# Patient Record
Sex: MALE | Race: WHITE | Employment: OTHER | URBAN - METROPOLITAN AREA
[De-identification: names, ages, dates, MRNs, and addresses within clinical notes are randomized per-mention and may not be internally consistent; named-entity substitution may affect disease eponyms.]

---

## 2020-09-16 ENCOUNTER — TELEPHONE (OUTPATIENT)
Dept: FAMILY MEDICINE CLINIC | Age: 65
End: 2020-09-16

## 2020-09-16 NOTE — TELEPHONE ENCOUNTER
Pt tried to refill his lisinopril and it was rejected.  He said the pharmacy told him the Dr. Nellie Monaco denied the script and the pt wants to know why

## 2021-02-02 ENCOUNTER — OFFICE VISIT (OUTPATIENT)
Dept: PRIMARY CARE CLINIC | Age: 66
End: 2021-02-02
Payer: COMMERCIAL

## 2021-02-02 VITALS
SYSTOLIC BLOOD PRESSURE: 138 MMHG | DIASTOLIC BLOOD PRESSURE: 78 MMHG | HEIGHT: 68 IN | HEART RATE: 76 BPM | OXYGEN SATURATION: 96 % | TEMPERATURE: 97 F | BODY MASS INDEX: 33.56 KG/M2 | WEIGHT: 221.4 LBS

## 2021-02-02 DIAGNOSIS — G89.29 CHRONIC RIGHT SHOULDER PAIN: ICD-10-CM

## 2021-02-02 DIAGNOSIS — E11.9 TYPE 2 DIABETES MELLITUS WITHOUT COMPLICATION, WITHOUT LONG-TERM CURRENT USE OF INSULIN (HCC): Primary | ICD-10-CM

## 2021-02-02 DIAGNOSIS — M10.9 ACUTE GOUT OF RIGHT FOOT, UNSPECIFIED CAUSE: ICD-10-CM

## 2021-02-02 DIAGNOSIS — M25.511 CHRONIC RIGHT SHOULDER PAIN: ICD-10-CM

## 2021-02-02 PROBLEM — I25.10 CORONARY ATHEROSCLEROSIS: Status: ACTIVE | Noted: 2021-02-02

## 2021-02-02 PROBLEM — I10 HYPERTENSION: Status: ACTIVE | Noted: 2021-02-02

## 2021-02-02 PROBLEM — E78.5 DYSLIPIDEMIA: Status: ACTIVE | Noted: 2021-02-02

## 2021-02-02 LAB — HBA1C MFR BLD: 8.5 %

## 2021-02-02 PROCEDURE — 3052F HG A1C>EQUAL 8.0%<EQUAL 9.0%: CPT | Performed by: FAMILY MEDICINE

## 2021-02-02 PROCEDURE — 99214 OFFICE O/P EST MOD 30 MIN: CPT | Performed by: FAMILY MEDICINE

## 2021-02-02 PROCEDURE — 83036 HEMOGLOBIN GLYCOSYLATED A1C: CPT | Performed by: FAMILY MEDICINE

## 2021-02-02 RX ORDER — AMLODIPINE BESYLATE 10 MG/1
10 TABLET ORAL DAILY
COMMUNITY
Start: 2021-01-04

## 2021-02-02 RX ORDER — GLIMEPIRIDE 2 MG/1
TABLET ORAL
Qty: 540 TABLET | Refills: 1 | Status: SHIPPED | OUTPATIENT
Start: 2021-02-02 | End: 2021-08-24

## 2021-02-02 RX ORDER — CARVEDILOL 6.25 MG/1
6.25 TABLET ORAL 2 TIMES DAILY
COMMUNITY
Start: 2021-01-25

## 2021-02-02 RX ORDER — LISINOPRIL AND HYDROCHLOROTHIAZIDE 25; 20 MG/1; MG/1
1 TABLET ORAL DAILY
COMMUNITY
End: 2021-02-02 | Stop reason: SDUPTHER

## 2021-02-02 RX ORDER — METFORMIN HYDROCHLORIDE 500 MG/1
TABLET, EXTENDED RELEASE ORAL
COMMUNITY
Start: 2020-12-10 | End: 2021-02-02 | Stop reason: SDUPTHER

## 2021-02-02 RX ORDER — GLIMEPIRIDE 2 MG/1
2 TABLET ORAL
COMMUNITY
End: 2021-02-02 | Stop reason: SDUPTHER

## 2021-02-02 RX ORDER — LISINOPRIL AND HYDROCHLOROTHIAZIDE 25; 20 MG/1; MG/1
1 TABLET ORAL DAILY
Qty: 90 TABLET | Refills: 1 | Status: SHIPPED | OUTPATIENT
Start: 2021-02-02 | End: 2021-07-26

## 2021-02-02 RX ORDER — METFORMIN HYDROCHLORIDE 500 MG/1
TABLET, EXTENDED RELEASE ORAL
Qty: 360 TABLET | Refills: 1 | Status: SHIPPED | OUTPATIENT
Start: 2021-02-02 | End: 2021-12-15 | Stop reason: SDUPTHER

## 2021-02-02 RX ORDER — CLOPIDOGREL BISULFATE 75 MG/1
75 TABLET ORAL DAILY
COMMUNITY
Start: 2020-11-16

## 2021-02-02 RX ORDER — LISINOPRIL 10 MG/1
10 TABLET ORAL DAILY
Qty: 90 TABLET | Refills: 1 | Status: SHIPPED | OUTPATIENT
Start: 2021-02-02 | End: 2021-07-26

## 2021-02-02 RX ORDER — LISINOPRIL 10 MG/1
10 TABLET ORAL DAILY
COMMUNITY
Start: 2020-12-08 | End: 2021-02-02 | Stop reason: SDUPTHER

## 2021-02-02 RX ORDER — INDOMETHACIN 50 MG/1
50 CAPSULE ORAL 3 TIMES DAILY
Qty: 60 CAPSULE | Refills: 0 | Status: SHIPPED | OUTPATIENT
Start: 2021-02-02 | End: 2021-11-01

## 2021-02-02 SDOH — ECONOMIC STABILITY: FOOD INSECURITY: WITHIN THE PAST 12 MONTHS, THE FOOD YOU BOUGHT JUST DIDN'T LAST AND YOU DIDN'T HAVE MONEY TO GET MORE.: NEVER TRUE

## 2021-02-02 SDOH — ECONOMIC STABILITY: FOOD INSECURITY: WITHIN THE PAST 12 MONTHS, YOU WORRIED THAT YOUR FOOD WOULD RUN OUT BEFORE YOU GOT MONEY TO BUY MORE.: NEVER TRUE

## 2021-02-02 SDOH — ECONOMIC STABILITY: TRANSPORTATION INSECURITY
IN THE PAST 12 MONTHS, HAS THE LACK OF TRANSPORTATION KEPT YOU FROM MEDICAL APPOINTMENTS OR FROM GETTING MEDICATIONS?: NOT ASKED

## 2021-02-02 ASSESSMENT — PATIENT HEALTH QUESTIONNAIRE - PHQ9
SUM OF ALL RESPONSES TO PHQ9 QUESTIONS 1 & 2: 0
1. LITTLE INTEREST OR PLEASURE IN DOING THINGS: 0
SUM OF ALL RESPONSES TO PHQ QUESTIONS 1-9: 0

## 2021-02-02 ASSESSMENT — ENCOUNTER SYMPTOMS
ALLERGIC/IMMUNOLOGIC NEGATIVE: 1
GASTROINTESTINAL NEGATIVE: 1
EYES NEGATIVE: 1
RESPIRATORY NEGATIVE: 1

## 2021-02-02 NOTE — PROGRESS NOTES
-     glimepiride (AMARYL) 2 MG tablet; 2 TABS IN THE MORNING AND 3 TABS AT NIGHT  -     indomethacin (INDOCIN) 50 MG capsule;  Take 1 capsule by mouth 3 times daily    try indocin for foot and shoukder  Discussed cardiac warnings            Electronically signed by Gus Rosado MD on 2/2/2021 at 9:58 AM

## 2021-02-23 ENCOUNTER — PATIENT MESSAGE (OUTPATIENT)
Dept: PRIMARY CARE CLINIC | Age: 66
End: 2021-02-23

## 2021-02-24 NOTE — TELEPHONE ENCOUNTER
From: Durene Gilford  To: Luli Leach MD  Sent: 2/23/2021 6:18 PM EST  Subject: Non-Urgent Medical Question    i had a office visit with doctor [facility] Samm Craig and received a bill for $191.00 . Is this what is being charged for an office visit. ? the doctor spent 10 minutes with me.

## 2021-06-08 ENCOUNTER — TELEPHONE (OUTPATIENT)
Dept: PRIMARY CARE CLINIC | Age: 66
End: 2021-06-08

## 2021-06-08 NOTE — TELEPHONE ENCOUNTER
Called and left voicemail for patient to call back- need to know if he will have a colonoscopy or cologuard done

## 2021-06-14 ENCOUNTER — OFFICE VISIT (OUTPATIENT)
Dept: PRIMARY CARE CLINIC | Age: 66
End: 2021-06-14
Payer: MEDICARE

## 2021-06-14 VITALS
HEART RATE: 73 BPM | DIASTOLIC BLOOD PRESSURE: 89 MMHG | OXYGEN SATURATION: 97 % | HEIGHT: 68 IN | WEIGHT: 200.4 LBS | BODY MASS INDEX: 30.37 KG/M2 | SYSTOLIC BLOOD PRESSURE: 135 MMHG

## 2021-06-14 DIAGNOSIS — E11.9 TYPE 2 DIABETES MELLITUS WITHOUT COMPLICATION, WITHOUT LONG-TERM CURRENT USE OF INSULIN (HCC): Primary | ICD-10-CM

## 2021-06-14 PROCEDURE — 1123F ACP DISCUSS/DSCN MKR DOCD: CPT | Performed by: FAMILY MEDICINE

## 2021-06-14 PROCEDURE — 3017F COLORECTAL CA SCREEN DOC REV: CPT | Performed by: FAMILY MEDICINE

## 2021-06-14 PROCEDURE — 4040F PNEUMOC VAC/ADMIN/RCVD: CPT | Performed by: FAMILY MEDICINE

## 2021-06-14 PROCEDURE — 1036F TOBACCO NON-USER: CPT | Performed by: FAMILY MEDICINE

## 2021-06-14 PROCEDURE — 83036 HEMOGLOBIN GLYCOSYLATED A1C: CPT | Performed by: FAMILY MEDICINE

## 2021-06-14 PROCEDURE — G8417 CALC BMI ABV UP PARAM F/U: HCPCS | Performed by: FAMILY MEDICINE

## 2021-06-14 PROCEDURE — G8427 DOCREV CUR MEDS BY ELIG CLIN: HCPCS | Performed by: FAMILY MEDICINE

## 2021-06-14 PROCEDURE — 2022F DILAT RTA XM EVC RTNOPTHY: CPT | Performed by: FAMILY MEDICINE

## 2021-06-14 PROCEDURE — 99213 OFFICE O/P EST LOW 20 MIN: CPT | Performed by: FAMILY MEDICINE

## 2021-06-14 PROCEDURE — 3052F HG A1C>EQUAL 8.0%<EQUAL 9.0%: CPT | Performed by: FAMILY MEDICINE

## 2021-06-14 RX ORDER — AMLODIPINE BESYLATE 10 MG/1
10 TABLET ORAL DAILY
COMMUNITY
Start: 2021-04-08 | End: 2021-11-01

## 2021-06-15 NOTE — PROGRESS NOTES
Subjective:      Patient ID: Judy Catherine is a 78 y.o. male. Sugars not controlled A1C cant even be measured  Has financial constraints  Doesn't want insulin but we cant find another oral to prescribe      Review of Systems   Constitutional: Negative. All other systems reviewed and are negative. Objective:   Physical Exam  Vitals reviewed. HENT:      Head: Normocephalic. Nose: Nose normal.   Eyes:      Pupils: Pupils are equal, round, and reactive to light. Cardiovascular:      Rate and Rhythm: Normal rate and regular rhythm. Pulmonary:      Effort: Pulmonary effort is normal.   Abdominal:      General: Abdomen is flat. Musculoskeletal:         General: Normal range of motion. Cervical back: Normal range of motion. Skin:     General: Skin is warm. Neurological:      Mental Status: He is alert. Psychiatric:         Mood and Affect: Mood normal.         Thought Content: Thought content normal.         Assessment:      1. Type 2 diabetes mellitus without complication, without long-term current use of insulin ([facility] [address]            Plan:      Margareth Alcantara was seen today for hypertension and diabetes.     Diagnoses and all orders for this visit:    Type 2 diabetes mellitus without complication, without long-term current use of insulin ([facility])  -     POCT glycosylated hemoglobin (Hb A1C)      Try to find an oral he can afford          Electronically signed by Drake Reese MD on 6/14/2021 at 1:03 PM

## 2021-06-24 ENCOUNTER — TELEPHONE (OUTPATIENT)
Dept: PRIMARY CARE CLINIC | Age: 66
End: 2021-06-24

## 2021-06-24 NOTE — TELEPHONE ENCOUNTER
Was in last week for diabetes check. A1C was not recorded. States Dood was going to change or add a new med to help with control. Pt says his blood sugar is at 200. He would rather not take insulin, he wants an oral medication instead.

## 2021-06-25 NOTE — TELEPHONE ENCOUNTER
Patient called and stated the med is 400 if you have no other options for him he states he can get it cheap in [facility] with a hand written script.

## 2021-06-29 ENCOUNTER — TELEPHONE (OUTPATIENT)
Dept: PRIMARY CARE CLINIC | Age: 66
End: 2021-06-29

## 2021-07-13 ENCOUNTER — NURSE TRIAGE (OUTPATIENT)
Dept: OTHER | Facility: CLINIC | Age: 66
End: 2021-07-13

## 2021-07-13 NOTE — TELEPHONE ENCOUNTER
Reason for Disposition   Numbness (i.e., loss of sensation) in hand or fingers    Answer Assessment - Initial Assessment Questions  1. ONSET: \"When did the pain start? \"      Started a year ago     2. LOCATION: \"Where is the pain located? \"      Left arm/shoulder - \"ulnar nerve done 25 years ago\"    3. PAIN: \"How bad is the pain? \" (Scale 1-10; or mild, moderate, severe)    - MILD (1-3): doesn't interfere with normal activities    - MODERATE (4-7): interferes with normal activities (e.g., work or school) or awakens from sleep    - SEVERE (8-10): excruciating pain, unable to do any normal activities, unable to hold a cup of water      Annoying per pt - \"I seem to be loosing power and strength in that arm\"       4. WORK OR EXERCISE: \"Has there been any recent work or exercise that involved this part of the body? \"      Denies     5. CAUSE: \"What do you think is causing the arm pain? \"      \"nerve in the elbow or something in the shoulder \"     6. OTHER SYMPTOMS: \"Do you have any other symptoms? \" (e.g., neck pain, swelling, rash, fever, numbness, weakness)     Hands go numb every once in a while- last 6-8 months - intermittent     7. PREGNANCY: \"Is there any chance you are pregnant? \" \"When was your last menstrual period? \"      N/A    Protocols used: ARM PAIN-ADULT-    Received call from Mina  at [facility] with [employer]. Brief description of triage: chronic pain in the left arm/shoulder. See assessment above. Triage indicates for patient to See PCP in 24 hours. Care advice provided, patient verbalizes understanding; denies any other questions or concerns; instructed to call back for any new or worsening symptoms. Writer provided warm transfer to [facility]  at [facility] for appointment scheduling. Attention Provider: Thank you for allowing me to participate in the care of your patient. The patient was connected to triage in response to information provided to the [facility].   Please do not respond through this encounter as the response is not directed to a shared pool.

## 2021-07-14 ENCOUNTER — OFFICE VISIT (OUTPATIENT)
Dept: PRIMARY CARE CLINIC | Age: 66
End: 2021-07-14
Payer: MEDICARE

## 2021-07-14 VITALS
HEIGHT: 68 IN | BODY MASS INDEX: 33.95 KG/M2 | SYSTOLIC BLOOD PRESSURE: 125 MMHG | DIASTOLIC BLOOD PRESSURE: 82 MMHG | WEIGHT: 224 LBS | HEART RATE: 76 BPM | OXYGEN SATURATION: 93 %

## 2021-07-14 DIAGNOSIS — G89.29 CHRONIC LEFT SHOULDER PAIN: Primary | ICD-10-CM

## 2021-07-14 DIAGNOSIS — M25.512 CHRONIC LEFT SHOULDER PAIN: Primary | ICD-10-CM

## 2021-07-14 PROCEDURE — 99213 OFFICE O/P EST LOW 20 MIN: CPT | Performed by: FAMILY MEDICINE

## 2021-07-14 PROCEDURE — G8427 DOCREV CUR MEDS BY ELIG CLIN: HCPCS | Performed by: FAMILY MEDICINE

## 2021-07-14 PROCEDURE — 3017F COLORECTAL CA SCREEN DOC REV: CPT | Performed by: FAMILY MEDICINE

## 2021-07-14 PROCEDURE — G8417 CALC BMI ABV UP PARAM F/U: HCPCS | Performed by: FAMILY MEDICINE

## 2021-07-14 PROCEDURE — 1036F TOBACCO NON-USER: CPT | Performed by: FAMILY MEDICINE

## 2021-07-14 PROCEDURE — 4040F PNEUMOC VAC/ADMIN/RCVD: CPT | Performed by: FAMILY MEDICINE

## 2021-07-14 PROCEDURE — 1123F ACP DISCUSS/DSCN MKR DOCD: CPT | Performed by: FAMILY MEDICINE

## 2021-07-16 NOTE — PROGRESS NOTES
Subjective:      Patient ID: Javon Reyes is a 78 y.o. male. Now that he is retired he is ready to work on shoulder    Other        Review of Systems   Constitutional: Negative. Musculoskeletal:        Pain and restricted motion of left shoulder   All other systems reviewed and are negative. Objective:   Physical Exam  Vitals reviewed. Constitutional:       Appearance: Normal appearance. Cardiovascular:      Pulses: Normal pulses. Pulmonary:      Effort: Pulmonary effort is normal.   Musculoskeletal:      Comments: Painful limited ROM left shoulder         Assessment:      1. Chronic left shoulder pain            Plan:      Maggie Coffey was seen today for other.     Diagnoses and all orders for this visit:    Chronic left shoulder pain  -     Michelle Ordaz DO, Orthopedic Surgery, [place]                Electronically signed by Cezar Medeiros MD on 7/14/2021 at 1:09 PM

## 2021-07-23 ENCOUNTER — PATIENT MESSAGE (OUTPATIENT)
Dept: PRIMARY CARE CLINIC | Age: 66
End: 2021-07-23

## 2021-07-23 NOTE — TELEPHONE ENCOUNTER
From: Carol Munoz  To: Glenys Mccarthy MD  Sent: 7/23/2021 1:05 PM EDT  Subject: Prescription Question    is it possible for doctor to renew my lisinopril/HCTZ 20-25 tab and my Lisinopril 10mg today ?

## 2021-07-26 RX ORDER — LISINOPRIL AND HYDROCHLOROTHIAZIDE 25; 20 MG/1; MG/1
TABLET ORAL
Qty: 90 TABLET | Refills: 0 | Status: SHIPPED | OUTPATIENT
Start: 2021-07-26 | End: 2021-07-27 | Stop reason: SDUPTHER

## 2021-07-26 RX ORDER — LISINOPRIL 10 MG/1
TABLET ORAL
Qty: 90 TABLET | Refills: 0 | Status: SHIPPED | OUTPATIENT
Start: 2021-07-26 | End: 2021-11-01

## 2021-07-28 RX ORDER — LISINOPRIL AND HYDROCHLOROTHIAZIDE 25; 20 MG/1; MG/1
TABLET ORAL
Qty: 90 TABLET | Refills: 0 | Status: SHIPPED | OUTPATIENT
Start: 2021-07-28 | End: 2022-01-12 | Stop reason: SDUPTHER

## 2021-08-24 RX ORDER — GLIMEPIRIDE 2 MG/1
TABLET ORAL
Qty: 540 TABLET | Refills: 0 | Status: SHIPPED | OUTPATIENT
Start: 2021-08-24 | End: 2022-01-07 | Stop reason: SDUPTHER

## 2021-09-22 DIAGNOSIS — R19.7 DIARRHEA, UNSPECIFIED TYPE: Primary | ICD-10-CM

## 2021-10-02 ENCOUNTER — PATIENT MESSAGE (OUTPATIENT)
Dept: PRIMARY CARE CLINIC | Age: 66
End: 2021-10-02

## 2021-10-04 NOTE — TELEPHONE ENCOUNTER
From: Ishmael Murders  To: Dennis Nino MD  Sent: 10/2/2021 10:03 AM EDT  Subject: Non-Urgent Medical Question    did you get me a GI for my diarrhea problem ?

## 2021-10-25 ENCOUNTER — TELEPHONE (OUTPATIENT)
Dept: GASTROENTEROLOGY | Age: 66
End: 2021-10-25

## 2021-11-01 ENCOUNTER — HOSPITAL ENCOUNTER (OUTPATIENT)
Age: 66
Setting detail: SPECIMEN
Discharge: HOME | End: 2021-11-01
Payer: MEDICARE

## 2021-11-01 ENCOUNTER — OFFICE VISIT (OUTPATIENT)
Dept: GASTROENTEROLOGY | Age: 66
End: 2021-11-01
Payer: MEDICARE

## 2021-11-01 VITALS
WEIGHT: 217 LBS | TEMPERATURE: 98.2 F | HEIGHT: 68 IN | SYSTOLIC BLOOD PRESSURE: 139 MMHG | HEART RATE: 76 BPM | OXYGEN SATURATION: 93 % | DIASTOLIC BLOOD PRESSURE: 72 MMHG | BODY MASS INDEX: 32.89 KG/M2

## 2021-11-01 DIAGNOSIS — R19.7 DIARRHEA, UNSPECIFIED TYPE: ICD-10-CM

## 2021-11-01 DIAGNOSIS — R19.7 DIARRHEA, UNSPECIFIED TYPE: Primary | ICD-10-CM

## 2021-11-01 LAB
ABSOLUTE EOS #: 0.09 K/UL (ref 0–0.44)
ABSOLUTE IMMATURE GRANULOCYTE: <0.03 K/UL (ref 0–0.3)
ABSOLUTE LYMPH #: 2.49 K/UL (ref 1.1–3.7)
ABSOLUTE MONO #: 0.63 K/UL (ref 0.1–1.2)
ALBUMIN SERPL-MCNC: 4.6 G/DL (ref 3.5–5.2)
ALBUMIN/GLOBULIN RATIO: 1.4 (ref 1–2.5)
ALP BLD-CCNC: 124 U/L (ref 40–129)
ALT SERPL-CCNC: 31 U/L (ref 5–41)
AST SERPL-CCNC: 20 U/L
BASOPHILS # BLD: 0 % (ref 0–2)
BASOPHILS ABSOLUTE: 0.04 K/UL (ref 0–0.2)
BILIRUB SERPL-MCNC: 0.48 MG/DL (ref 0.3–1.2)
BILIRUBIN DIRECT: 0.08 MG/DL
BILIRUBIN, INDIRECT: 0.4 MG/DL (ref 0–1)
DIFFERENTIAL TYPE: ABNORMAL
EOSINOPHILS RELATIVE PERCENT: 1 % (ref 1–4)
GLOBULIN: NORMAL G/DL (ref 1.5–3.8)
HCT VFR BLD CALC: 55.7 % (ref 40.7–50.3)
HEMOGLOBIN: 18.4 G/DL (ref 13–17)
IMMATURE GRANULOCYTES: 0 %
LIPASE: 137 U/L (ref 13–60)
LYMPHOCYTES # BLD: 28 % (ref 24–43)
MCH RBC QN AUTO: 29.8 PG (ref 25.2–33.5)
MCHC RBC AUTO-ENTMCNC: 33 G/DL (ref 28.4–34.8)
MCV RBC AUTO: 90.3 FL (ref 82.6–102.9)
MONOCYTES # BLD: 7 % (ref 3–12)
NRBC AUTOMATED: 0 PER 100 WBC
PDW BLD-RTO: 13.2 % (ref 11.8–14.4)
PLATELET # BLD: 245 K/UL (ref 138–453)
PLATELET ESTIMATE: ABNORMAL
PMV BLD AUTO: 9.8 FL (ref 8.1–13.5)
RBC # BLD: 6.17 M/UL (ref 4.21–5.77)
RBC # BLD: ABNORMAL 10*6/UL
SEG NEUTROPHILS: 64 % (ref 36–65)
SEGMENTED NEUTROPHILS ABSOLUTE COUNT: 5.75 K/UL (ref 1.5–8.1)
TOTAL PROTEIN: 8 G/DL (ref 6.4–8.3)
WBC # BLD: 9 K/UL (ref 3.5–11.3)
WBC # BLD: ABNORMAL 10*3/UL

## 2021-11-01 PROCEDURE — G8484 FLU IMMUNIZE NO ADMIN: HCPCS | Performed by: INTERNAL MEDICINE

## 2021-11-01 PROCEDURE — G8427 DOCREV CUR MEDS BY ELIG CLIN: HCPCS | Performed by: INTERNAL MEDICINE

## 2021-11-01 PROCEDURE — 4040F PNEUMOC VAC/ADMIN/RCVD: CPT | Performed by: INTERNAL MEDICINE

## 2021-11-01 PROCEDURE — G8417 CALC BMI ABV UP PARAM F/U: HCPCS | Performed by: INTERNAL MEDICINE

## 2021-11-01 PROCEDURE — 1036F TOBACCO NON-USER: CPT | Performed by: INTERNAL MEDICINE

## 2021-11-01 PROCEDURE — 1123F ACP DISCUSS/DSCN MKR DOCD: CPT | Performed by: INTERNAL MEDICINE

## 2021-11-01 PROCEDURE — 99204 OFFICE O/P NEW MOD 45 MIN: CPT | Performed by: INTERNAL MEDICINE

## 2021-11-01 PROCEDURE — 3017F COLORECTAL CA SCREEN DOC REV: CPT | Performed by: INTERNAL MEDICINE

## 2021-11-01 RX ORDER — POLYETHYLENE GLYCOL 3350 17 G/17G
POWDER, FOR SOLUTION ORAL
Qty: 238 G | Refills: 0 | Status: SHIPPED | OUTPATIENT
Start: 2021-11-01 | End: 2021-12-14

## 2021-11-01 ASSESSMENT — ENCOUNTER SYMPTOMS
BLOOD IN STOOL: 0
SORE THROAT: 0
ABDOMINAL DISTENTION: 0
DIARRHEA: 1
ANAL BLEEDING: 0
COLOR CHANGE: 0
RECTAL PAIN: 1
COUGH: 0
CONSTIPATION: 0
BACK PAIN: 0
WHEEZING: 0
SHORTNESS OF BREATH: 0
NAUSEA: 0
ABDOMINAL PAIN: 1
TROUBLE SWALLOWING: 0
VOMITING: 0

## 2021-11-01 NOTE — TELEPHONE ENCOUNTER
Miralax Gatorade Colonoscopy Prep    Procedure Date    Monday 11/29/21    Arrive    @     7:15am      Procedure starts     @ 8:45am     Dr Franky Butt -Report to Entrance C/Registration Desk    *You MUST have a  for the procedure due to sedation, bring your list of medications, photo ID and Insurance card       STOP TAKING the following blood thinners: (Aspirin, Aspirin Products, Ibuprofen, Motrin, Mobic, Excedrin, Aleve) ONE week before the procedure on: Mon 11/22/21  If you need anything for pain; TYLENOL, and any other products that contain Acetaminophen are safe to use. **We will contact your ordering physician and call you with the stop date for the following prescription blood thinners: Coumadin/Warfarin, Plavix, Xarelto, Eliquis, Pradaxa, Brilinta, Pletal, or Effient  These are only a few blood thinners, if you have any questions regarding any other medication please ask. Please pick-up the Miralax bowel prep at your pharmacy at least one week prior to your procedure to ensure you have the supplies you need for your prep day. From your pharmacy you will receive, your prescription of (1) 238-gram bottle of Miralax and Dulcolax tablets. You will need to Purchase, (2) 32 ounce bottles of Gatorade (Not Red or Purple) *keep room temp*    Step 1: On  Sunday November 28, 2021, the day before your procedure:   ** FROM THE TIME YOU WAKE UP, UNTIL 12 MIDNIGHT --DO NOT EAT any solid foods, milk or milk products. CLEAR LIQUIDS ONLY (see back for list), nothing RED or Purple                 Step 2: At 10am, take the Dulcolax tablets     Step 3: At 2pm, mix the bottle of Miralax powder with the (2) bottles of Gatorade in a large container and stir vigorously until no Miralax is seen floating.  You have between 2pm and midnight to

## 2021-11-01 NOTE — PROGRESS NOTES
Reason for Referral:   Tuan Sweeney, [address] [facility] [id number] [facility],  [address]    Chief Complaint   Patient presents with    New Patient    Diarrhea           HISTORY OF PRESENT ILLNESS: Mr.Floyd Orson Dakin is a 78 y.o. male , referred for evaluation of* diarrhea   here for the first time    diarrhea 9 months, painless without any fever or chills without any cramps without any bleeding multiple times   denied Milena   bm every 1-2hours    no blood  No  cramps    no f/c   colonoscopy in the past , years ago   Hx colectomy for diverticulosis     Research epic on [facility], I cannot find any recent labs or imaging    Past Medical,Family, and Social History reviewed and does contribute to the patient presentingcondition. Patient's PMH/PSH,SH,PSYCH Hx, MEDs, ALLERGIES, and ROS were all reviewed and updated in the appropriate sections. PAST MEDICAL HISTORY:  Past Medical History:   Diagnosis Date    COPD (chronic obstructive pulmonary disease) ([facility] [address]     Diabetes mellitus ([facility] [address]     Hypertension        No past surgical history on file.     CURRENT MEDICATIONS:    Current Outpatient Medications:     glimepiride (AMARYL) 2 MG tablet, TAKE 2 TABLETS BY MOUTH IN THE MORNING AND  3  TABLETS  AT  NIGHT, Disp: 540 tablet, Rfl: 0    lisinopril-hydroCHLOROthiazide (PRINZIDE;ZESTORETIC) 20-25 MG per tablet, Take 1 tablet by mouth once daily, Disp: 90 tablet, Rfl: 0    amLODIPine (NORVASC) 10 MG tablet, Take 10 mg by mouth daily, Disp: , Rfl:     carvedilol (COREG) 6.25 MG tablet, Take 6.25 mg by mouth 2 times daily, Disp: , Rfl:     clopidogrel (PLAVIX) 75 MG tablet, Take 75 mg by mouth daily, Disp: , Rfl:     metFORMIN (GLUCOPHAGE-XR) 500 MG extended release tablet, TAKE TWO TABS WITH BREAKFAST AND TWO TABLETS WITH SUPPER, Disp: 360 tablet, Rfl: 1    dapagliflozin (FARXIGA) 10 MG tablet, Take 1 tablet by mouth every morning (Patient not taking: Reported on 7/14/2021), Disp: Systems   Constitutional: Negative for unexpected weight change. HENT: Negative for sore throat and trouble swallowing. Eyes: Negative for visual disturbance. Respiratory: Negative for cough, shortness of breath and wheezing. Cardiovascular: Negative for chest pain, palpitations and leg swelling. Gastrointestinal: Positive for abdominal pain, diarrhea and rectal pain. Negative for abdominal distention, anal bleeding, blood in stool, constipation, nausea and vomiting. Genitourinary: Negative for difficulty urinating. Musculoskeletal: Negative for back pain, joint swelling and neck pain. Skin: Negative for color change, rash and wound. Neurological: Negative for dizziness, weakness, light-headedness and headaches. Hematological: Bruises/bleeds easily. Psychiatric/Behavioral: Positive for sleep disturbance. Negative for confusion and hallucinations. The patient is not nervous/anxious. LABORATORY DATA: Reviewed  No results found for: WBC, HGB, HCT, MCV, PLT, NA, K, CL, CO2, BUN, CREATININE, LABPROT, LABALBU, GGT, BILITOT, ALKPHOS, AST, ALT, INR      No results found for: RBC, HGB, MCV, MCH, MCHC, RDW, MPV, BASOPCT, LYMPHSABS, MONOSABS, NEUTROABS, EOSABS, BASOSABS      DIAGNOSTIC TESTING:     No results found. BP (!) 143/73 (Site: Right Upper Arm, Position: Sitting, Cuff Size: Large Adult)   Pulse 76   Temp 98.2 °F (36.8 °C) (Temporal)   Ht 5' 8\" (1.727 m)   Wt 217 lb (98.4 kg)   SpO2 93%   BMI 32.99 kg/m²     PHYSICAL EXAMINATION: Vital signs reviewed per the nursing documentation. Body mass index is 32.99 kg/m². Physical Exam  Vitals and nursing note reviewed. Constitutional:       General: He is not in acute distress. Appearance: He is well-developed. He is not diaphoretic. HENT:      Head: Normocephalic and atraumatic. Eyes:      General: No scleral icterus. Pupils: Pupils are equal, round, and reactive to light. Neck:      Thyroid: No thyromegaly. Vascular: No JVD. Trachea: No tracheal deviation. Cardiovascular:      Rate and Rhythm: Normal rate and regular rhythm. Heart sounds: Normal heart sounds. No murmur heard. Pulmonary:      Effort: Pulmonary effort is normal. No respiratory distress. Breath sounds: Normal breath sounds. No wheezing. Abdominal:      General: Bowel sounds are normal. There is no distension. Palpations: Abdomen is soft. There is no mass. Tenderness: There is no abdominal tenderness. There is no guarding or rebound. Musculoskeletal:         General: No tenderness. Normal range of motion. Cervical back: Normal range of motion and neck supple. Skin:     General: Skin is warm. Coloration: Skin is not pale. Findings: No erythema or rash. Comments: He is not diaphoretic   Neurological:      Mental Status: He is alert and oriented to person, place, and time. Deep Tendon Reflexes: Reflexes are normal and symmetric. Psychiatric:         Behavior: Behavior normal.         Thought Content: Thought content normal.         Judgment: Judgment normal.         IMPRESSION: Mr. Gi Padilla is a 78 y.o. male with      Diagnosis Orders   1. Diarrhea, unspecified type  Lipase    Hepatic Function Panel    CBC Auto Differential    COLONOSCOPY W/ OR W/O BIOPSY     Will proceed with the above testing as a first step and take it from there  Patient had history of colectomy because of diverticulosis in the past  Absolutely denying cholecystectomy although it was reported on his history!!    Diet/life style/natural hx /complication of the dx were all explained in details   Past medical, past surgical, social history, psychiatric history, medications or allergies, all reviewed and  updated        Thank you for allowing me to participate in the care of Mr. Gi Padilla. For any further questions please do not hesitate to contact me. I have reviewed and agree with the MA/RN ROS.    Note is dictated utilizing voice recognition software. Unfortunately this leads to occasional typographical errors. Please contact our office if you have any questions.     Abdoul Leon MD  [facility]  O: #[phone]

## 2021-11-05 RX ORDER — LISINOPRIL 10 MG/1
TABLET ORAL
Qty: 90 TABLET | Refills: 0 | Status: SHIPPED | OUTPATIENT
Start: 2021-11-05 | End: 2022-01-12 | Stop reason: SDUPTHER

## 2021-11-09 NOTE — TELEPHONE ENCOUNTER
Rec'd clearance from Dr Rasheed Conteh, pt may undergo this procedure without additional cardiac testing. Clopidogrel may be held prior to the procedure. It should be resumed as soon as safely possible following the procedure. Writer spoke to Dr Alex Moscoso in regards to how many days he would like pt to stop Clopidogrel since Dr Rasheed Conteh did not specify how many days. Dr Alex Moscoso states to stop medication 4-5 days prior to procedure. Writer left msg on pt's vm informing him we rec'd clearance back from Dr Rasheed Conteh and he has okayed for pt to stop Clopidogrel prior to procedure, but did not specify how days prior to proc. Left msg informing pt we spoke w/ Dr Alex Moscoso on how many days pt should medication and he directed him stop 4-5 days prior to proc. Left msg instructing pt to stop Clopidogrel on 11/24/21 for five days.

## 2021-11-22 ENCOUNTER — ANESTHESIA EVENT (OUTPATIENT)
Dept: OPERATING ROOM | Age: 66
End: 2021-11-22
Payer: MEDICARE

## 2021-11-29 ENCOUNTER — HOSPITAL ENCOUNTER (OUTPATIENT)
Age: 66
Setting detail: OUTPATIENT SURGERY
Discharge: HOME | End: 2021-11-29
Attending: INTERNAL MEDICINE | Admitting: INTERNAL MEDICINE
Payer: MEDICARE

## 2021-11-29 ENCOUNTER — ANESTHESIA (OUTPATIENT)
Dept: OPERATING ROOM | Age: 66
End: 2021-11-29
Payer: MEDICARE

## 2021-11-29 VITALS
WEIGHT: 217 LBS | RESPIRATION RATE: 19 BRPM | SYSTOLIC BLOOD PRESSURE: 123 MMHG | BODY MASS INDEX: 32.89 KG/M2 | HEART RATE: 73 BPM | TEMPERATURE: 97.8 F | HEIGHT: 68 IN | DIASTOLIC BLOOD PRESSURE: 71 MMHG | OXYGEN SATURATION: 94 %

## 2021-11-29 VITALS
TEMPERATURE: 96.8 F | DIASTOLIC BLOOD PRESSURE: 70 MMHG | OXYGEN SATURATION: 97 % | SYSTOLIC BLOOD PRESSURE: 122 MMHG | RESPIRATION RATE: 22 BRPM

## 2021-11-29 LAB — GLUCOSE BLD-MCNC: 229 MG/DL (ref 75–110)

## 2021-11-29 PROCEDURE — 45385 COLONOSCOPY W/LESION REMOVAL: CPT | Performed by: INTERNAL MEDICINE

## 2021-11-29 PROCEDURE — 2580000003 HC RX 258: Performed by: ANESTHESIOLOGY

## 2021-11-29 PROCEDURE — 7100000010 HC PHASE II RECOVERY - FIRST 15 MIN: Performed by: INTERNAL MEDICINE

## 2021-11-29 PROCEDURE — 88305 TISSUE EXAM BY PATHOLOGIST: CPT

## 2021-11-29 PROCEDURE — 6360000002 HC RX W HCPCS: Performed by: NURSE ANESTHETIST, CERTIFIED REGISTERED

## 2021-11-29 PROCEDURE — 3609010600 HC COLONOSCOPY POLYPECTOMY SNARE/COLD BIOPSY: Performed by: INTERNAL MEDICINE

## 2021-11-29 PROCEDURE — 3700000000 HC ANESTHESIA ATTENDED CARE: Performed by: INTERNAL MEDICINE

## 2021-11-29 PROCEDURE — 7100000011 HC PHASE II RECOVERY - ADDTL 15 MIN: Performed by: INTERNAL MEDICINE

## 2021-11-29 PROCEDURE — 82947 ASSAY GLUCOSE BLOOD QUANT: CPT

## 2021-11-29 PROCEDURE — 2709999900 HC NON-CHARGEABLE SUPPLY: Performed by: INTERNAL MEDICINE

## 2021-11-29 PROCEDURE — 3700000001 HC ADD 15 MINUTES (ANESTHESIA): Performed by: INTERNAL MEDICINE

## 2021-11-29 RX ORDER — HYDRALAZINE HYDROCHLORIDE 20 MG/ML
10 INJECTION INTRAMUSCULAR; INTRAVENOUS EVERY 10 MIN PRN
Status: DISCONTINUED | OUTPATIENT
Start: 2021-11-29 | End: 2021-11-29 | Stop reason: HOSPADM

## 2021-11-29 RX ORDER — LABETALOL 20 MG/4 ML (5 MG/ML) INTRAVENOUS SYRINGE
10 EVERY 10 MIN PRN
Status: DISCONTINUED | OUTPATIENT
Start: 2021-11-29 | End: 2021-11-29 | Stop reason: HOSPADM

## 2021-11-29 RX ORDER — SODIUM CHLORIDE 0.9 % (FLUSH) 0.9 %
10 SYRINGE (ML) INJECTION EVERY 12 HOURS SCHEDULED
Status: DISCONTINUED | OUTPATIENT
Start: 2021-11-29 | End: 2021-11-29 | Stop reason: HOSPADM

## 2021-11-29 RX ORDER — SODIUM CHLORIDE 0.9 % (FLUSH) 0.9 %
10 SYRINGE (ML) INJECTION PRN
Status: DISCONTINUED | OUTPATIENT
Start: 2021-11-29 | End: 2021-11-29 | Stop reason: HOSPADM

## 2021-11-29 RX ORDER — MONTELUKAST SODIUM 4 MG/1
1 TABLET, CHEWABLE ORAL DAILY
Qty: 60 TABLET | Refills: 3 | Status: SHIPPED | OUTPATIENT
Start: 2021-11-29

## 2021-11-29 RX ORDER — SODIUM CHLORIDE 9 MG/ML
INJECTION, SOLUTION INTRAVENOUS CONTINUOUS
Status: DISCONTINUED | OUTPATIENT
Start: 2021-11-29 | End: 2021-11-29 | Stop reason: HOSPADM

## 2021-11-29 RX ORDER — PROPOFOL 10 MG/ML
INJECTION, EMULSION INTRAVENOUS PRN
Status: DISCONTINUED | OUTPATIENT
Start: 2021-11-29 | End: 2021-11-29 | Stop reason: SDUPTHER

## 2021-11-29 RX ORDER — DIPHENHYDRAMINE HYDROCHLORIDE 50 MG/ML
12.5 INJECTION INTRAMUSCULAR; INTRAVENOUS
Status: DISCONTINUED | OUTPATIENT
Start: 2021-11-29 | End: 2021-11-29 | Stop reason: HOSPADM

## 2021-11-29 RX ORDER — PROMETHAZINE HYDROCHLORIDE 25 MG/ML
6.25 INJECTION, SOLUTION INTRAMUSCULAR; INTRAVENOUS
Status: DISCONTINUED | OUTPATIENT
Start: 2021-11-29 | End: 2021-11-29 | Stop reason: HOSPADM

## 2021-11-29 RX ORDER — SODIUM CHLORIDE, SODIUM LACTATE, POTASSIUM CHLORIDE, CALCIUM CHLORIDE 600; 310; 30; 20 MG/100ML; MG/100ML; MG/100ML; MG/100ML
INJECTION, SOLUTION INTRAVENOUS CONTINUOUS
Status: DISCONTINUED | OUTPATIENT
Start: 2021-11-29 | End: 2021-11-29 | Stop reason: HOSPADM

## 2021-11-29 RX ORDER — OXYCODONE HYDROCHLORIDE AND ACETAMINOPHEN 5; 325 MG/1; MG/1
2 TABLET ORAL PRN
Status: DISCONTINUED | OUTPATIENT
Start: 2021-11-29 | End: 2021-11-29 | Stop reason: HOSPADM

## 2021-11-29 RX ORDER — 0.9 % SODIUM CHLORIDE 0.9 %
500 INTRAVENOUS SOLUTION INTRAVENOUS
Status: DISCONTINUED | OUTPATIENT
Start: 2021-11-29 | End: 2021-11-29 | Stop reason: HOSPADM

## 2021-11-29 RX ORDER — ONDANSETRON 2 MG/ML
4 INJECTION INTRAMUSCULAR; INTRAVENOUS
Status: DISCONTINUED | OUTPATIENT
Start: 2021-11-29 | End: 2021-11-29 | Stop reason: HOSPADM

## 2021-11-29 RX ORDER — MEPERIDINE HYDROCHLORIDE 50 MG/ML
12.5 INJECTION INTRAMUSCULAR; INTRAVENOUS; SUBCUTANEOUS EVERY 5 MIN PRN
Status: DISCONTINUED | OUTPATIENT
Start: 2021-11-29 | End: 2021-11-29 | Stop reason: HOSPADM

## 2021-11-29 RX ORDER — OXYCODONE HYDROCHLORIDE AND ACETAMINOPHEN 5; 325 MG/1; MG/1
1 TABLET ORAL PRN
Status: DISCONTINUED | OUTPATIENT
Start: 2021-11-29 | End: 2021-11-29 | Stop reason: HOSPADM

## 2021-11-29 RX ORDER — MORPHINE SULFATE 2 MG/ML
2 INJECTION, SOLUTION INTRAMUSCULAR; INTRAVENOUS EVERY 5 MIN PRN
Status: DISCONTINUED | OUTPATIENT
Start: 2021-11-29 | End: 2021-11-29 | Stop reason: HOSPADM

## 2021-11-29 RX ORDER — SODIUM CHLORIDE 9 MG/ML
25 INJECTION, SOLUTION INTRAVENOUS PRN
Status: DISCONTINUED | OUTPATIENT
Start: 2021-11-29 | End: 2021-11-29 | Stop reason: HOSPADM

## 2021-11-29 RX ORDER — MIDAZOLAM HYDROCHLORIDE 2 MG/2ML
1 INJECTION, SOLUTION INTRAMUSCULAR; INTRAVENOUS ONCE
Status: DISCONTINUED | OUTPATIENT
Start: 2021-11-29 | End: 2021-11-29 | Stop reason: HOSPADM

## 2021-11-29 RX ADMIN — PROPOFOL INJECTABLE EMULSION 50 MG: 10 INJECTION, EMULSION INTRAVENOUS at 08:32

## 2021-11-29 RX ADMIN — SODIUM CHLORIDE: 9 INJECTION, SOLUTION INTRAVENOUS at 08:22

## 2021-11-29 RX ADMIN — PROPOFOL INJECTABLE EMULSION 50 MG: 10 INJECTION, EMULSION INTRAVENOUS at 08:25

## 2021-11-29 RX ADMIN — PROPOFOL INJECTABLE EMULSION 50 MG: 10 INJECTION, EMULSION INTRAVENOUS at 08:41

## 2021-11-29 RX ADMIN — PROPOFOL INJECTABLE EMULSION 50 MG: 10 INJECTION, EMULSION INTRAVENOUS at 08:28

## 2021-11-29 RX ADMIN — PROPOFOL INJECTABLE EMULSION 50 MG: 10 INJECTION, EMULSION INTRAVENOUS at 08:45

## 2021-11-29 RX ADMIN — PROPOFOL INJECTABLE EMULSION 50 MG: 10 INJECTION, EMULSION INTRAVENOUS at 08:37

## 2021-11-29 ASSESSMENT — PULMONARY FUNCTION TESTS
PIF_VALUE: 1
PIF_VALUE: 0
PIF_VALUE: 1

## 2021-11-29 ASSESSMENT — PAIN SCALES - GENERAL
PAINLEVEL_OUTOF10: 0
PAINLEVEL_OUTOF10: 0

## 2021-11-29 NOTE — H&P
Procedure History and Physical    Pre-Procedural Diagnosis:      diarrhea    Indications:  same    Procedure Planned: colonoscopy     History Obtained From:  patient    HISTORY OF PRESENT ILLNESS:       The patient is a 78 y.o. male who presents for the above procedure. Past Medical History:    Past Medical History:   Diagnosis Date    [facility]     Diabetes mellitus ([facility]     Hypertension        Past Surgical History:    History reviewed. No pertinent surgical history.     Medications:  Current Facility-Administered Medications   Medication Dose Route Frequency Provider Last Rate Last Admin    0.9 % sodium chloride infusion   IntraVENous Continuous Terrie Holstein, MD        lactated ringers infusion   IntraVENous Continuous Terrie Holstein, MD        sodium chloride flush 0.9 % injection 10 mL  10 mL IntraVENous 2 times per day Terrie Holstein, MD        sodium chloride flush 0.9 % injection 10 mL  10 mL IntraVENous PRN Terrie Holstein, MD        0.9 % sodium chloride infusion  25 mL IntraVENous PRN Terrie Holstein, MD        meperidine (DEMEROL) injection 12.5 mg  12.5 mg IntraVENous Q5 Min PRN Rex Bautista MD        morphine (PF) injection 2 mg  2 mg IntraVENous Q5 Min PRN Rex Bautista MD        HYDROmorphone (DILAUDID) injection 0.5 mg  0.5 mg IntraVENous Q5 Min PRN Rex Bautista MD        HYDROmorphone (DILAUDID) injection 0.25 mg  0.25 mg IntraVENous Q5 Min PRN Rex Bautista MD        HYDROmorphone (DILAUDID) injection 0.5 mg  0.5 mg IntraVENous Q5 Min PRN Rex Bautista MD        oxyCODONE-acetaminophen (PERCOCET) 5-325 MG per tablet 1 tablet  1 tablet Oral PRN Rex Bautista MD        Or    oxyCODONE-acetaminophen (PERCOCET) 5-325 MG per tablet 2 tablet  2 tablet Oral PRN Rex Bautista MD        ondansetron (ZOFRAN) injection 4 mg  4 mg IntraVENous Once PRN Rex Bautista MD        promethazine (PHENERGAN) injection 6.25 mg  6.25 mg IntraVENous Q15 Min PRN Nacho Hough MD        0.9 % sodium chloride bolus  500 mL IntraVENous Once PRN Nacho Hough MD        diphenhydrAMINE (BENADRYL) injection 12.5 mg  12.5 mg IntraVENous Once PRN Nacho Hough MD        labetalol (NORMODYNE;TRANDATE) injection syringe 10 mg  10 mg IntraVENous Q10 Min PRN Nacho Hough MD        hydrALAZINE (APRESOLINE) injection 10 mg  10 mg IntraVENous Q10 Min PRN Nacho Hough MD        midazolam PF (VERSED) injection 1 mg  1 mg IntraVENous Once Nacho Hough MD           Allergies:   No Known Allergies              Social   Social History     Tobacco Use    Smoking status: Never Smoker    Smokeless tobacco: Never Used   Substance Use Topics    Alcohol use: Yes     Alcohol/week: 1.0 standard drink     Types: 1 Cans of beer per week     Comment: occasionally        PSYCH HISTORY:  Depression No  Anxiety No  Suicide No       History reviewed. No pertinent family history. No family history of colon cancer, Crohn's disease, or ulcerative colitis    Problems with Sedation/Anesthesia in the past? no    REVIEW OF SYSTEMS:  12 point review of systems negative other than mentioned above.       PHYSICAL EXAM:    Vitals:  BP (!) 172/84   Pulse 74   Temp 98 °F (36.7 °C)   Resp 16   Ht 5' 8\" (1.727 m)   Wt 217 lb (98.4 kg)   SpO2 95%   BMI 32.99 kg/m²     Focused Exam related to procedure:    General appearance: NAD, conversant   Eyes: anicteric sclerae, moist conjunctivae; no lid-lag; PERRLA   Lungs: CTA, with normal respiratory effort and no intercostal retractions   CV: RRR, no MRGs   Abdomen: Soft, non-tender; no masses or HSM   Skin: Normal temperature, turgor and texture; no rash, ulcers or subcutaneous nodules     DATA:  CBC:   Lab Results   Component Value Date    WBC 9.0 11/01/2021    HGB 18.4 (H) 11/01/2021    HCT 55.7 (H) 11/01/2021    MCV 90.3 11/01/2021     11/01/2021     BUN/Cr: No results found for: BUN, No results found for: CREATININE  Potassium: No results found for: K  PT/INR: No results found for: INR, PROTIME    ASSESSMENT AND PLAN:       1. Patient is a 78 y.o. male with above specified procedure planned. Expected Sedation/Anesthesia Type: MAC    2. ASA ([address] Anesthesiology) Anesthesia Status: Class 1 - A normal healthy patient    3. Mallampati: I (soft palate, uvula, fauces, tonsillar pillars visible)  4. Procedure options, risks and benefits reviewed with Patient. Patient expresses understanding.     5.  Consent has been signed:  Yes    Maryjo Mcmahon MD

## 2021-11-29 NOTE — OP NOTE
.        The prep was good. Findings:  Terminal ileum: normal    Cecum/Ascending colon: abnormal: Diverticulosis    Transverse colon: abnormal: Diverticulosis    Descending/Sigmoid colon: abnormal:     3 sigmoid polyps the largest of which was 12 mm removed with a snare put in 1 jar,    Clean surgical anastomosis        Rectum/Anus: examined in normal and retroflexed positions and was abnormal: Severely engorged veins and hemorrhoids probably related to the surgical changes    Random colon biopsy because of the diarrhea throughout the colon        Withdrawal Time was (minutes): 10    The colon was decompressed and the scope was removed. The patient tolerated the procedure well. Recommendations/Plan:   1. Lifestyle and dietary modifications as discussed  2. F/U Biopsies  3. F/U In OfficeYes  4. Discussed with the family  5.  Repeat colonoscopy [id number]    Electronically signed by Emily Levi MD  on 11/29/2021 at 8:52 AM

## 2021-11-29 NOTE — ANESTHESIA PRE PROCEDURE
Department of Anesthesiology  Preprocedure Note       Name:  Claribel Vazquez   Age:  78 y.o.  :  1955                                          MRN:  [id number]         Date:  2021      Surgeon: Darion Andrews):  Alma Avila MD    Procedure: Procedure(s):  COLONOSCOPY DIAGNOSTIC    Medications prior to admission:   Prior to Admission medications    Medication Sig Start Date End Date Taking? Authorizing Provider   lisinopril (PRINIVIL;ZESTRIL) 10 MG tablet Take 1 tablet by mouth once daily 21  Yes Alexus Patel MD   polyethylene glycol [facility]) 17 GM/SCOOP powder Use as directed by following your patient instructions given by office.  21  Yes Violet Luther MD   bisacodyl (DULCOLAX) 5 MG EC tablet TAKE 4 TABS AT 10 AM THE DAY PRIOR TO COLONOSCOPY 21  Yes Violet Luther MD   glimepiride (AMARYL) 2 MG tablet TAKE 2 TABLETS BY MOUTH IN THE MORNING AND  3  TABLETS  AT  NIGHT 21  Yes Alexus Patel MD   lisinopril-hydroCHLOROthiazide RUBIN [facility]) 20-25 MG per tablet Take 1 tablet by mouth once daily 21  Yes Alexus Patel MD   amLODIPine (NORVASC) 10 MG tablet Take 10 mg by mouth daily 21  Yes Historical Provider, MD   carvedilol (COREG) 6.25 MG tablet Take 6.25 mg by mouth 2 times daily 21  Yes Historical Provider, MD   metFORMIN (GLUCOPHAGE-XR) 500 MG extended release tablet TAKE TWO TABS WITH BREAKFAST AND TWO TABLETS WITH SUPPER 21  Yes Alexus Patel MD   clopidogrel (PLAVIX) 75 MG tablet Take 75 mg by mouth daily 20   Historical Provider, MD       Current medications:    Current Facility-Administered Medications   Medication Dose Route Frequency Provider Last Rate Last Admin    0.9 % sodium chloride infusion   IntraVENous Continuous Kyle Sims MD        lactated ringers infusion   IntraVENous Continuous Kyle Sims MD        sodium chloride flush 0.9 % injection 10 mL  10 mL IntraVENous 2 times per day Kyle Sims MD        sodium chloride flush 0.9 % injection 10 mL  10 mL IntraVENous PRN Ab Benito MD        0.9 % sodium chloride infusion  25 mL IntraVENous PRN Ab Benito MD           Allergies:  No Known Allergies    Problem List:    Patient Active Problem List   Diagnosis Code    COPD with exacerbation ([facility] J44.1    Coronary atherosclerosis I25.10    Diabetes mellitus ([facility] E11.9    Dyslipidemia E78.5    Hypertension I10    Wheezing R06.2       Past Medical History:        Diagnosis Date    COPD (chronic obstructive pulmonary disease) ([facility]     Diabetes mellitus ([facility]     Hypertension        Past Surgical History:  History reviewed. No pertinent surgical history. Social History:    Social History     Tobacco Use    Smoking status: Never Smoker    Smokeless tobacco: Never Used   Substance Use Topics    Alcohol use: Yes     Alcohol/week: 1.0 standard drink     Types: 1 Cans of beer per week     Comment: occasionally                                Counseling given: Not Answered      Vital Signs (Current):   Vitals:    11/29/21 0736   BP: (!) 172/84   Pulse: 74   Resp: 16   Temp: 98 °F (36.7 °C)   SpO2: 95%   Weight: 217 lb (98.4 kg)   Height: 5' 8\" (1.727 m)                                              BP Readings from Last 3 Encounters:   11/29/21 (!) 172/84   11/01/21 139/72   07/14/21 125/82       NPO Status:                                                                                 BMI:   Wt Readings from Last 3 Encounters:   11/29/21 217 lb (98.4 kg)   11/01/21 217 lb (98.4 kg)   07/14/21 224 lb (101.6 kg)     Body mass index is 32.99 kg/m².     CBC:   Lab Results   Component Value Date    WBC 9.0 11/01/2021    RBC 6.17 11/01/2021    HGB 18.4 11/01/2021    HCT 55.7 11/01/2021    MCV 90.3 11/01/2021    RDW 13.2 11/01/2021     11/01/2021       CMP:   Lab Results   Component Value Date    PROT 8.0 11/01/2021    BILITOT 0.48 11/01/2021    ALKPHOS 124 11/01/2021    AST 20 11/01/2021    ALT 31 11/01/2021       POC Tests: No results for input(s): POCGLU, POCNA, POCK, POCCL, POCBUN, POCHEMO, POCHCT in the last 72 hours. Coags: No results found for: PROTIME, INR, APTT    HCG (If Applicable): No results found for: PREGTESTUR, PREGSERUM, HCG, HCGQUANT     ABGs: No results found for: PHART, PO2ART, EXQ1BHJ, NCX0ZNR, BEART, R8QBZGJW     Type & Screen (If Applicable):  No results found for: LABABO, LABRH    Drug/Infectious Status (If Applicable):  No results found for: HIV, HEPCAB    COVID-19 Screening (If Applicable): No results found for: COVID19        Anesthesia Evaluation  Patient summary reviewed and Nursing notes reviewed  Airway: Mallampati: II  TM distance: >3 FB   Neck ROM: full  Mouth opening: > = 3 FB Dental:      Comment: -MISSING SOME UPPER AND LOWER TEETH    Pulmonary:Negative Pulmonary ROS and normal exam                               Cardiovascular:    (+) hypertension:, past MI:, CAD:,                ROS comment: -S/P CARDIAC STENTS X 2 - LAST ONE PLACED 5 YEARS AGO     Neuro/Psych:   Negative Neuro/Psych ROS              GI/Hepatic/Renal: Neg GI/Hepatic/Renal ROS            Endo/Other:    (+) Diabetes, . ROS comment: -NPO AFTER MIDNIGHT  -NKDA Abdominal:             Vascular:           ROS comment: -ANTICOAGULATED - STOPPED PLAVIX 6 DAYS AGO. Other Findings:             Anesthesia Plan      MAC     ASA 3       Induction: intravenous. MIPS: Postoperative opioids intended and Prophylactic antiemetics administered. Anesthetic plan and risks discussed with patient. Plan discussed with CRNA.     Attending anesthesiologist reviewed and agrees with Claudia Schwartz MD   11/29/2021

## 2021-12-01 LAB — SURGICAL PATHOLOGY REPORT: NORMAL

## 2021-12-14 ENCOUNTER — OFFICE VISIT (OUTPATIENT)
Dept: PRIMARY CARE CLINIC | Age: 66
End: 2021-12-14
Payer: MEDICARE

## 2021-12-14 VITALS
HEIGHT: 68 IN | OXYGEN SATURATION: 98 % | HEART RATE: 67 BPM | SYSTOLIC BLOOD PRESSURE: 138 MMHG | BODY MASS INDEX: 33.8 KG/M2 | WEIGHT: 223 LBS | DIASTOLIC BLOOD PRESSURE: 70 MMHG

## 2021-12-14 DIAGNOSIS — H10.9 CONJUNCTIVITIS OF BOTH EYES, UNSPECIFIED CONJUNCTIVITIS TYPE: Primary | ICD-10-CM

## 2021-12-14 PROCEDURE — 99213 OFFICE O/P EST LOW 20 MIN: CPT | Performed by: FAMILY MEDICINE

## 2021-12-14 PROCEDURE — G8484 FLU IMMUNIZE NO ADMIN: HCPCS | Performed by: FAMILY MEDICINE

## 2021-12-14 PROCEDURE — 1036F TOBACCO NON-USER: CPT | Performed by: FAMILY MEDICINE

## 2021-12-14 PROCEDURE — G8417 CALC BMI ABV UP PARAM F/U: HCPCS | Performed by: FAMILY MEDICINE

## 2021-12-14 PROCEDURE — G8428 CUR MEDS NOT DOCUMENT: HCPCS | Performed by: FAMILY MEDICINE

## 2021-12-14 PROCEDURE — 4040F PNEUMOC VAC/ADMIN/RCVD: CPT | Performed by: FAMILY MEDICINE

## 2021-12-14 PROCEDURE — 3017F COLORECTAL CA SCREEN DOC REV: CPT | Performed by: FAMILY MEDICINE

## 2021-12-14 PROCEDURE — 1123F ACP DISCUSS/DSCN MKR DOCD: CPT | Performed by: FAMILY MEDICINE

## 2021-12-14 ASSESSMENT — PATIENT HEALTH QUESTIONNAIRE - PHQ9
SUM OF ALL RESPONSES TO PHQ QUESTIONS 1-9: 0
SUM OF ALL RESPONSES TO PHQ9 QUESTIONS 1 & 2: 0
1. LITTLE INTEREST OR PLEASURE IN DOING THINGS: 0
2. FEELING DOWN, DEPRESSED OR HOPELESS: 0

## 2021-12-15 RX ORDER — METFORMIN HYDROCHLORIDE 500 MG/1
TABLET, EXTENDED RELEASE ORAL
Qty: 360 TABLET | Refills: 1 | Status: SHIPPED | OUTPATIENT
Start: 2021-12-15 | End: 2022-06-17

## 2021-12-15 ASSESSMENT — ENCOUNTER SYMPTOMS
EYE REDNESS: 1
EYE DISCHARGE: 1

## 2021-12-15 NOTE — PROGRESS NOTES
Subjective:      Patient ID: Ishmael Duckworth is a 78 y.o. male. Eyes red  goopy stuck shut in morning  Tried everything over the counter and no help      Review of Systems   Constitutional: Negative. Eyes: Positive for discharge and redness. Psychiatric/Behavioral: Negative. All other systems reviewed and are negative. Objective:   Physical Exam  Eyes:      General:         Right eye: Discharge present. Left eye: Discharge present. Assessment:      1. Conjunctivitis of both eyes, unspecified conjunctivitis type            Plan:      Arabella Stapleton was seen today for itchy eye. Diagnoses and all orders for this visit:    Conjunctivitis of both eyes, unspecified conjunctivitis type    Other orders  -     Neomycin-Polymyxin-Dexameth 0.1 % OINT;  Apply 0.5 inches to eye 4 times daily for 5 days                Electronically signed by Dennis Nino MD on 12/14/2021 at 12:42 PM

## 2022-01-07 RX ORDER — GLIMEPIRIDE 2 MG/1
TABLET ORAL
Qty: 540 TABLET | Refills: 0 | Status: SHIPPED | OUTPATIENT
Start: 2022-01-07 | End: 2022-05-13

## 2022-01-13 RX ORDER — LISINOPRIL 10 MG/1
10 TABLET ORAL DAILY
Qty: 90 TABLET | Refills: 3 | Status: SHIPPED | OUTPATIENT
Start: 2022-01-13

## 2022-01-13 RX ORDER — LISINOPRIL AND HYDROCHLOROTHIAZIDE 25; 20 MG/1; MG/1
TABLET ORAL
Qty: 90 TABLET | Refills: 2 | Status: SHIPPED | OUTPATIENT
Start: 2022-01-13

## 2022-02-07 ENCOUNTER — OFFICE VISIT (OUTPATIENT)
Dept: GASTROENTEROLOGY | Age: 67
End: 2022-02-07
Payer: MEDICARE

## 2022-02-07 VITALS
WEIGHT: 225 LBS | TEMPERATURE: 97.2 F | BODY MASS INDEX: 34.21 KG/M2 | SYSTOLIC BLOOD PRESSURE: 137 MMHG | DIASTOLIC BLOOD PRESSURE: 83 MMHG | HEART RATE: 75 BPM

## 2022-02-07 DIAGNOSIS — R19.7 DIARRHEA, UNSPECIFIED TYPE: Primary | ICD-10-CM

## 2022-02-07 DIAGNOSIS — K57.90 DIVERTICULOSIS: ICD-10-CM

## 2022-02-07 DIAGNOSIS — Z86.010 HX OF COLONIC POLYPS: ICD-10-CM

## 2022-02-07 PROCEDURE — 99214 OFFICE O/P EST MOD 30 MIN: CPT | Performed by: INTERNAL MEDICINE

## 2022-02-07 PROCEDURE — G8484 FLU IMMUNIZE NO ADMIN: HCPCS | Performed by: INTERNAL MEDICINE

## 2022-02-07 PROCEDURE — 1123F ACP DISCUSS/DSCN MKR DOCD: CPT | Performed by: INTERNAL MEDICINE

## 2022-02-07 PROCEDURE — 1036F TOBACCO NON-USER: CPT | Performed by: INTERNAL MEDICINE

## 2022-02-07 PROCEDURE — G8427 DOCREV CUR MEDS BY ELIG CLIN: HCPCS | Performed by: INTERNAL MEDICINE

## 2022-02-07 PROCEDURE — 4040F PNEUMOC VAC/ADMIN/RCVD: CPT | Performed by: INTERNAL MEDICINE

## 2022-02-07 PROCEDURE — G8417 CALC BMI ABV UP PARAM F/U: HCPCS | Performed by: INTERNAL MEDICINE

## 2022-02-07 PROCEDURE — 3017F COLORECTAL CA SCREEN DOC REV: CPT | Performed by: INTERNAL MEDICINE

## 2022-02-07 ASSESSMENT — ENCOUNTER SYMPTOMS
WHEEZING: 0
COUGH: 0
NAUSEA: 0
ABDOMINAL DISTENTION: 0
CONSTIPATION: 0
SHORTNESS OF BREATH: 0
BLOOD IN STOOL: 0
COLOR CHANGE: 0
ABDOMINAL PAIN: 0
SORE THROAT: 0
VOMITING: 0
DIARRHEA: 1
ANAL BLEEDING: 0
RECTAL PAIN: 0
BACK PAIN: 0
TROUBLE SWALLOWING: 0

## 2022-02-07 NOTE — PROGRESS NOTES
GI CLINIC FOLLOW UP    INTERVAL HISTORY:   No referring provider defined for this encounter. Chief Complaint   Patient presents with    Follow Up After Procedure     Pt is f/u on colonoscopy. he states he has diarrhea once in a while. He states the colestipol is working well. HISTORY OF PRESENT ILLNESS: Mr.Floyd PACO [place] is a 78 y.o. male , referred for evaluation of diarrhea, Hx colectomy for diverticulosis    here for f/u    last visit we prescribed Colistid , labs and colonoscopy   Labs : normal cbc/LFTs, slight elevation Lipase  Does drink beer    With the Colestid diarrhea  much better, almost gone  Patient is very satisfied with symptoms  No bleeding no fever no chills no nausea no vomiting  No abdominal imaging ever. The prep was good.       Findings:  Terminal ileum: normal     Cecum/Ascending colon: abnormal: Diverticulosis     Transverse colon: abnormal: Diverticulosis     Descending/Sigmoid colon: abnormal:      3 sigmoid polyps the largest of which was 12 mm removed with a snare put in 1 jar,     Clean surgical anastomosis      Rectum/Anus: examined in normal and retroflexed positions and was abnormal: Severely engorged veins and hemorrhoids probably related to the surgical changes     Random colon biopsy because of the diarrhea throughout the colon      Withdrawal Time was (minutes): 10     The colon was decompressed and the scope was removed. The patient tolerated the procedure well.      Recommendations/Plan:   1. Lifestyle and dietary modifications as discussed  2. F/U Biopsies  3. F/U In OfficeYes  4. Discussed with the family  5. Repeat colonoscopy [id number]     Electronically signed by Av Tomas MD  on 11/29/2021 at 8:52 AM   -- Diagnosis --   1.  RANDOM COLON, BIOPSIES:        -  NORMAL COLONIC MUCOSA. 2.  SIGMOID COLON, POLYPS, BIOPSIES:        -  ADENOMATOUS POLYPS (TUBULAR ADENOMAS).        Past Medical,Family, and Social History reviewed and does contribute to the patient presentingcondition. Patient's PMH/PSH,SH,PSYCH Hx, MEDs, ALLERGIES, and ROS were all reviewed and updated in the appropriate sections.     PAST MEDICAL HISTORY:  Past Medical History:   Diagnosis Date    [facility]     [facility]     Hypertension        Past Surgical History:   Procedure Laterality Date    COLONOSCOPY  11/29/2021    COLONOSCOPY DIAGNOSTIC    COLONOSCOPY N/A 11/29/2021    COLONOSCOPY SIGMOID COLON POLYPECTOMIES SNARE/RANDOM COLON BIOPSIES performed by Nona Kelly MD at [address]:    Current Outpatient Medications:     lisinopril (PRINIVIL;ZESTRIL) 10 MG tablet, Take 1 tablet by mouth daily, Disp: 90 tablet, Rfl: 3    lisinopril-hydroCHLOROthiazide (PRINZIDE;ZESTORETIC) 20-25 MG per tablet, Take 1 tablet by mouth once daily, Disp: 90 tablet, Rfl: 2    glimepiride (AMARYL) 2 MG tablet, TAKE 2 TABLETS BY MOUTH IN THE MORNING AND  3  TABLETS  AT  NIGHT, Disp: 540 tablet, Rfl: 0    metFORMIN (GLUCOPHAGE-XR) 500 MG extended release tablet, TAKE TWO TABS WITH BREAKFAST AND TWO TABLETS WITH SUPPER, Disp: 360 tablet, Rfl: 1    colestipol (COLESTID) 1 g tablet, Take 1 tablet by mouth daily, Disp: 60 tablet, Rfl: 3    amLODIPine (NORVASC) 10 MG tablet, Take 10 mg by mouth daily, Disp: , Rfl:     carvedilol (COREG) 6.25 MG tablet, Take 6.25 mg by mouth 2 times daily, Disp: , Rfl:     clopidogrel (PLAVIX) 75 MG tablet, Take 75 mg by mouth daily, Disp: , Rfl:     ALLERGIES:   No Known Allergies    FAMILY HISTORY:       Family history unknown: Yes         SOCIAL HISTORY:   Social History     Socioeconomic History    Marital status:      Spouse name: Not on file    Number of children: Not on file    Years of education: Not on file    Highest education level: Not on file   Occupational History    Not on file   Tobacco Use    Smoking status: Never Smoker    Smokeless tobacco: Never Used Vaping Use    Vaping Use: Never used   Substance and Sexual Activity    Alcohol use: Yes     Alcohol/week: 1.0 standard drink     Types: 1 Cans of beer per week     Comment: occasionally    Drug use: Never    Sexual activity: Not on file   Other Topics Concern    Not on file   Social History Narrative    Not on file     Social Determinants of Health     Financial Resource Strain:     Difficulty of Paying Living Expenses: Not on file   Food Insecurity:     Worried About Running Out of Food in the Last Year: Not on file    Francois of Food in the Last Year: Not on file   Transportation Needs:     Lack of Transportation (Medical): Not on file    Lack of Transportation (Non-Medical): Not on file   Physical Activity:     Days of Exercise per Week: Not on file    Minutes of Exercise per Session: Not on file   Stress:     Feeling of Stress : Not on file   Social Connections:     Frequency of Communication with Friends and Family: Not on file    Frequency of Social Gatherings with Friends and Family: Not on file    Attends Sikhism Services: Not on file    Active Member of [address] [place] [address] or Organizations: Not on file    Attends Club or Organization Meetings: Not on file    Marital Status: Not on file   Intimate Partner Violence:     Fear of Current or Ex-Partner: Not on file    Emotionally Abused: Not on file    Physically Abused: Not on file    Sexually Abused: Not on file   Housing Stability:     Unable to Pay for Housing in the Last Year: Not on file    Number of [place] in the Last Year: Not on file    Unstable Housing in the Last Year: Not on file       REVIEW OF SYSTEMS: A 12-point review of systemswas obtained and pertinent positives and negatives were enumerated above in the history of present illness. All other reviewed systems / symptoms were negative. Review of Systems   Constitutional: Negative for unexpected weight change. HENT: Negative for sore throat and trouble swallowing. Eyes: Negative for visual disturbance. Respiratory: Negative for cough, shortness of breath and wheezing. Cardiovascular: Negative for chest pain, palpitations and leg swelling. Gastrointestinal: Positive for diarrhea (sometimes). Negative for abdominal distention, abdominal pain, anal bleeding, blood in stool, constipation, nausea, rectal pain and vomiting. Genitourinary: Negative for difficulty urinating. Musculoskeletal: Negative for back pain, joint swelling and neck pain. Skin: Negative for color change, rash and wound. Neurological: Negative for dizziness, weakness, light-headedness and headaches. Hematological: Bruises/bleeds easily. Psychiatric/Behavioral: Positive for sleep disturbance. Negative for confusion and hallucinations. The patient is not nervous/anxious. LABORATORY DATA: Reviewed  Lab Results   Component Value Date    WBC 9.0 11/01/2021    HGB 18.4 (H) 11/01/2021    HCT 55.7 (H) 11/01/2021    MCV 90.3 11/01/2021     11/01/2021    LABALBU 4.6 11/01/2021    BILITOT 0.48 11/01/2021    ALKPHOS 124 11/01/2021    AST 20 11/01/2021    ALT 31 11/01/2021         Lab Results   Component Value Date    RBC 6.17 (H) 11/01/2021    HGB 18.4 (H) 11/01/2021    MCV 90.3 11/01/2021    MCH 29.8 11/01/2021    MCHC 33.0 11/01/2021    RDW 13.2 11/01/2021    MPV 9.8 11/01/2021    BASOPCT 0 11/01/2021    LYMPHSABS 2.49 11/01/2021    MONOSABS 0.63 11/01/2021    NEUTROABS 5.75 11/01/2021    EOSABS 0.09 11/01/2021    BASOSABS 0.04 11/01/2021         DIAGNOSTIC TESTING:     No results found. PHYSICAL EXAMINATION: Vital signs reviewed per the nursing documentation. /83   Pulse 75   Temp 97.2 °F (36.2 °C)   Wt 225 lb (102.1 kg)   BMI 34.21 kg/m²   Body mass index is 34.21 kg/m². Physical Exam  Vitals and nursing note reviewed. Constitutional:       General: He is not in acute distress. Appearance: He is well-developed. He is not diaphoretic.    HENT:      Head: Normocephalic and atraumatic. Eyes:      General: No scleral icterus. Pupils: Pupils are equal, round, and reactive to light. Neck:      Thyroid: No thyromegaly. Vascular: No JVD. Trachea: No tracheal deviation. Cardiovascular:      Rate and Rhythm: Normal rate and regular rhythm. Heart sounds: Normal heart sounds. No murmur heard. Pulmonary:      Effort: Pulmonary effort is normal. No respiratory distress. Breath sounds: Normal breath sounds. No wheezing. Abdominal:      General: Bowel sounds are normal. There is no distension. Palpations: Abdomen is soft. There is no mass. Tenderness: There is no abdominal tenderness. There is no guarding or rebound. Musculoskeletal:         General: No tenderness. Normal range of motion. Cervical back: Normal range of motion and neck supple. Skin:     General: Skin is warm. Coloration: Skin is not pale. Findings: No erythema or rash. Comments: He is not diaphoretic   Neurological:      Mental Status: He is alert and oriented to person, place, and time. Deep Tendon Reflexes: Reflexes are normal and symmetric. Psychiatric:         Behavior: Behavior normal.         Thought Content: Thought content normal.         Judgment: Judgment normal.           IMPRESSION: Mr. Robert  is a 78 y.o. male with      Diagnosis Orders   1. Diarrhea, unspecified type     2. Diverticulosis     3. Hx of colonic polyps       Continue with the same using Colestid  He is to report any new symptom  We will repeat his colonoscopy in 3 years  And take it from there    Diet/life style/natural hx /complication of the dx were all explained in details   Past medical, past surgical, social history, psychiatric history, medications or allergies, all reviewed and  updated    Thank you for allowing me to participate in the care of Mr. Robert . For any further questions please do not hesitate to contact me.     I have reviewed and agree with the ROS entered by the MA/RN. Note is dictated utilizing voice recognition software. Unfortunately this leads to occasional typographical errors. Please contact our office if you have any questions.       Natanael Santana MD  [facility]  O: #[phone]

## 2022-03-23 ENCOUNTER — OFFICE VISIT (OUTPATIENT)
Dept: PRIMARY CARE CLINIC | Age: 67
End: 2022-03-23
Payer: MEDICARE

## 2022-03-23 VITALS
HEIGHT: 68 IN | SYSTOLIC BLOOD PRESSURE: 122 MMHG | HEART RATE: 63 BPM | DIASTOLIC BLOOD PRESSURE: 62 MMHG | OXYGEN SATURATION: 93 % | WEIGHT: 225 LBS | BODY MASS INDEX: 34.1 KG/M2

## 2022-03-23 DIAGNOSIS — M70.21 OLECRANON BURSITIS OF RIGHT ELBOW: ICD-10-CM

## 2022-03-23 DIAGNOSIS — M25.521 RIGHT ELBOW PAIN: Primary | ICD-10-CM

## 2022-03-23 DIAGNOSIS — E11.9 TYPE 2 DIABETES MELLITUS WITHOUT COMPLICATION, WITHOUT LONG-TERM CURRENT USE OF INSULIN (HCC): ICD-10-CM

## 2022-03-23 LAB — HBA1C MFR BLD: 9.4 %

## 2022-03-23 PROCEDURE — 3017F COLORECTAL CA SCREEN DOC REV: CPT | Performed by: FAMILY MEDICINE

## 2022-03-23 PROCEDURE — 20605 DRAIN/INJ JOINT/BURSA W/O US: CPT | Performed by: FAMILY MEDICINE

## 2022-03-23 PROCEDURE — 83036 HEMOGLOBIN GLYCOSYLATED A1C: CPT | Performed by: FAMILY MEDICINE

## 2022-03-23 PROCEDURE — G8417 CALC BMI ABV UP PARAM F/U: HCPCS | Performed by: FAMILY MEDICINE

## 2022-03-23 PROCEDURE — 4040F PNEUMOC VAC/ADMIN/RCVD: CPT | Performed by: FAMILY MEDICINE

## 2022-03-23 PROCEDURE — G8484 FLU IMMUNIZE NO ADMIN: HCPCS | Performed by: FAMILY MEDICINE

## 2022-03-23 PROCEDURE — 1036F TOBACCO NON-USER: CPT | Performed by: FAMILY MEDICINE

## 2022-03-23 PROCEDURE — 3046F HEMOGLOBIN A1C LEVEL >9.0%: CPT | Performed by: FAMILY MEDICINE

## 2022-03-23 PROCEDURE — 1123F ACP DISCUSS/DSCN MKR DOCD: CPT | Performed by: FAMILY MEDICINE

## 2022-03-23 PROCEDURE — 99213 OFFICE O/P EST LOW 20 MIN: CPT | Performed by: FAMILY MEDICINE

## 2022-03-23 PROCEDURE — 2022F DILAT RTA XM EVC RTNOPTHY: CPT | Performed by: FAMILY MEDICINE

## 2022-03-23 PROCEDURE — G8427 DOCREV CUR MEDS BY ELIG CLIN: HCPCS | Performed by: FAMILY MEDICINE

## 2022-03-23 RX ORDER — SEMAGLUTIDE 1.34 MG/ML
0.25 INJECTION, SOLUTION SUBCUTANEOUS WEEKLY
Qty: 1.5 ML | Refills: 0 | COMMUNITY
Start: 2022-03-23 | End: 2022-07-18

## 2022-03-23 RX ORDER — SIMVASTATIN 40 MG
40 TABLET ORAL NIGHTLY
COMMUNITY
Start: 2022-03-01

## 2022-03-23 RX ORDER — TRIAMCINOLONE ACETONIDE 40 MG/ML
40 INJECTION, SUSPENSION INTRA-ARTICULAR; INTRAMUSCULAR ONCE
Status: COMPLETED | OUTPATIENT
Start: 2022-03-23 | End: 2022-03-23

## 2022-03-23 RX ADMIN — TRIAMCINOLONE ACETONIDE 40 MG: 40 INJECTION, SUSPENSION INTRA-ARTICULAR; INTRAMUSCULAR at 09:17

## 2022-03-23 SDOH — ECONOMIC STABILITY: FOOD INSECURITY: WITHIN THE PAST 12 MONTHS, YOU WORRIED THAT YOUR FOOD WOULD RUN OUT BEFORE YOU GOT MONEY TO BUY MORE.: NEVER TRUE

## 2022-03-23 SDOH — ECONOMIC STABILITY: FOOD INSECURITY: WITHIN THE PAST 12 MONTHS, THE FOOD YOU BOUGHT JUST DIDN'T LAST AND YOU DIDN'T HAVE MONEY TO GET MORE.: NEVER TRUE

## 2022-03-23 ASSESSMENT — PATIENT HEALTH QUESTIONNAIRE - PHQ9
SUM OF ALL RESPONSES TO PHQ QUESTIONS 1-9: 0
1. LITTLE INTEREST OR PLEASURE IN DOING THINGS: 0
SUM OF ALL RESPONSES TO PHQ QUESTIONS 1-9: 0
2. FEELING DOWN, DEPRESSED OR HOPELESS: 0
SUM OF ALL RESPONSES TO PHQ QUESTIONS 1-9: 0
SUM OF ALL RESPONSES TO PHQ QUESTIONS 1-9: 0
SUM OF ALL RESPONSES TO PHQ9 QUESTIONS 1 & 2: 0

## 2022-03-23 ASSESSMENT — SOCIAL DETERMINANTS OF HEALTH (SDOH): HOW HARD IS IT FOR YOU TO PAY FOR THE VERY BASICS LIKE FOOD, HOUSING, MEDICAL CARE, AND HEATING?: NOT HARD AT ALL

## 2022-03-23 NOTE — PROGRESS NOTES
Subjective:     Patient ID: Kim Camarena is a 78 y.o. male    HPI  This patient is new to me and presents with right elbow pain. He says been swollen for about a week. In the past it got much bigger with more fluid but then it seemed to dissipate. He then had discoloration in his forearm. His history sounds like olecranon bursitis which may or may not be traumatic. He has past medical history includes heart failure and diabetes which of course are both risk factors. Review of Systems   Constitutional: Negative for fever. HENT: Negative for congestion, ear pain and sore throat. Respiratory: Negative for shortness of breath and wheezing. Cardiovascular: Negative for chest pain and leg swelling. Gastrointestinal: Negative for abdominal pain. Genitourinary: Negative for dysuria. Musculoskeletal: Positive for arthralgias and joint swelling. Skin: Negative for rash. Neurological: Negative for syncope. Hematological: Negative for adenopathy. Psychiatric/Behavioral: Negative for sleep disturbance. The patient is nervous/anxious. Objective:     Physical Exam  Vitals and nursing note reviewed. Constitutional:       General: He is not in acute distress. Appearance: Normal appearance. HENT:      Head: Normocephalic. Right Ear: External ear normal.      Left Ear: External ear normal.      Nose: Nose normal.      Mouth/Throat:      Mouth: Mucous membranes are moist.   Eyes:      Conjunctiva/sclera: Conjunctivae normal.   Cardiovascular:      Rate and Rhythm: Normal rate and regular rhythm. Heart sounds: Normal heart sounds. Pulmonary:      Effort: Pulmonary effort is normal.      Breath sounds: Normal breath sounds. Musculoskeletal:      Cervical back: Normal range of motion. Right lower leg: No edema. Left lower leg: No edema. Comments: Examination of the right elbow shows full range of motion.   I do not feel a joint body on exam.  There is no real point tenderness to warrant an x-ray today. He does have a mild effusion in the olecranon area consistent with a partially resolved bursitis. There is no warmth to suggest infection. No erythema. Recommend that we inject the bursa with corticosteroid 40 mg today. Apply compression wrap. Then he needs to wear an elbow sleeve. He should return the office in follow-up   Skin:     General: Skin is warm and dry. Neurological:      General: No focal deficit present. Mental Status: He is alert and oriented to person, place, and time. Psychiatric:         Mood and Affect: Mood normal.         Behavior: Behavior normal.     Procedure note: Explained bursal injection to him with potential complications of infection and bleeding. He elects to proceed. Area scrubbed in the usual fashion. 40 mg of triamcinolone is injected by needle into the olecranon bursa without complications. There is no bleeding or hemorrhage associated with this. Patient tolerated procedure well. A sterile dressing was applied as well as an Ace wrap. Recommend compression for about 4 hours then he can apply a sleeve. He may use ice and Tylenol as needed patient verbalized understanding. Assessment/Plan:     1. Right elbow pain    2. Olecranon bursitis of right elbow    3. Type 2 diabetes mellitus without complication, without long-term current use of insulin ([address]          Melly Babcock was seen today for elbow pain. Diagnoses and all orders for this visit:    Olecranon bursitis of right elbow  See instructions as detailed above. Type 2 diabetes mellitus without complication, without long-term current use of insulin ([facility])  -     POCT glycosylated hemoglobin (Hb A1C)    Other orders  -     triamcinolone acetonide (KENALOG-40) injection 40 mg  -     Semaglutide,0.25 or 0.5MG/DOS, (OZEMPIC, 0.25 OR 0.5 MG/DOSE,) 2 MG/1.5ML SOPN; Inject 0.25 mg into the skin once a week    Return to office if any problems.   This bursitis may be recurrent. Will need follow-up in regard to his chronic care. Won Pelaez MD    Please note that this chart was generated using voice recognition Dragon dictation software. Although every effort was made to ensure the accuracy of this automated transcription, some errors in transcription may have occurred.

## 2022-03-26 ASSESSMENT — ENCOUNTER SYMPTOMS
SORE THROAT: 0
SHORTNESS OF BREATH: 0
WHEEZING: 0
ABDOMINAL PAIN: 0

## 2022-04-20 ENCOUNTER — OFFICE VISIT (OUTPATIENT)
Dept: PRIMARY CARE CLINIC | Age: 67
End: 2022-04-20
Payer: MEDICARE

## 2022-04-20 VITALS
BODY MASS INDEX: 32.71 KG/M2 | SYSTOLIC BLOOD PRESSURE: 108 MMHG | WEIGHT: 215.8 LBS | HEIGHT: 68 IN | HEART RATE: 83 BPM | OXYGEN SATURATION: 94 % | DIASTOLIC BLOOD PRESSURE: 60 MMHG

## 2022-04-20 DIAGNOSIS — J02.9 ACUTE PHARYNGITIS, UNSPECIFIED ETIOLOGY: Primary | ICD-10-CM

## 2022-04-20 DIAGNOSIS — E11.9 TYPE 2 DIABETES MELLITUS WITHOUT COMPLICATION, WITHOUT LONG-TERM CURRENT USE OF INSULIN (HCC): ICD-10-CM

## 2022-04-20 DIAGNOSIS — R05.9 COUGH: ICD-10-CM

## 2022-04-20 PROCEDURE — 1036F TOBACCO NON-USER: CPT | Performed by: FAMILY MEDICINE

## 2022-04-20 PROCEDURE — 3046F HEMOGLOBIN A1C LEVEL >9.0%: CPT | Performed by: FAMILY MEDICINE

## 2022-04-20 PROCEDURE — 4040F PNEUMOC VAC/ADMIN/RCVD: CPT | Performed by: FAMILY MEDICINE

## 2022-04-20 PROCEDURE — 1123F ACP DISCUSS/DSCN MKR DOCD: CPT | Performed by: FAMILY MEDICINE

## 2022-04-20 PROCEDURE — 99214 OFFICE O/P EST MOD 30 MIN: CPT | Performed by: FAMILY MEDICINE

## 2022-04-20 PROCEDURE — G8427 DOCREV CUR MEDS BY ELIG CLIN: HCPCS | Performed by: FAMILY MEDICINE

## 2022-04-20 PROCEDURE — G8417 CALC BMI ABV UP PARAM F/U: HCPCS | Performed by: FAMILY MEDICINE

## 2022-04-20 PROCEDURE — 2022F DILAT RTA XM EVC RTNOPTHY: CPT | Performed by: FAMILY MEDICINE

## 2022-04-20 PROCEDURE — 3017F COLORECTAL CA SCREEN DOC REV: CPT | Performed by: FAMILY MEDICINE

## 2022-04-20 RX ORDER — SEMAGLUTIDE 1.34 MG/ML
0.5 INJECTION, SOLUTION SUBCUTANEOUS WEEKLY
Qty: 1 PEN | Refills: 0 | COMMUNITY
Start: 2022-04-20 | End: 2022-07-18

## 2022-04-20 RX ORDER — AMOXICILLIN 500 MG/1
500 CAPSULE ORAL 3 TIMES DAILY
Qty: 30 CAPSULE | Refills: 0 | Status: SHIPPED | OUTPATIENT
Start: 2022-04-20 | End: 2022-04-30

## 2022-04-20 RX ORDER — PROMETHAZINE HYDROCHLORIDE AND CODEINE PHOSPHATE 6.25; 1 MG/5ML; MG/5ML
5 SYRUP ORAL EVERY 4 HOURS PRN
Qty: 120 ML | Refills: 0 | Status: SHIPPED | OUTPATIENT
Start: 2022-04-20 | End: 2022-04-27

## 2022-04-20 ASSESSMENT — PATIENT HEALTH QUESTIONNAIRE - PHQ9
SUM OF ALL RESPONSES TO PHQ QUESTIONS 1-9: 0
SUM OF ALL RESPONSES TO PHQ9 QUESTIONS 1 & 2: 0
SUM OF ALL RESPONSES TO PHQ QUESTIONS 1-9: 0
1. LITTLE INTEREST OR PLEASURE IN DOING THINGS: 0
2. FEELING DOWN, DEPRESSED OR HOPELESS: 0
SUM OF ALL RESPONSES TO PHQ QUESTIONS 1-9: 0
SUM OF ALL RESPONSES TO PHQ QUESTIONS 1-9: 0

## 2022-04-20 NOTE — PROGRESS NOTES
Subjective:     Patient ID: Harriett Vail is a 78 y.o. male    HPI  Reyna Moreno returns today for recheck on his olecranon bursitis his diabetes and he also has URI type symptoms. Olecranon bursitis: His pain is totally gone and he no longer has any fluid. Injection was successful. Recommend that he protected elbows that she does not bump it again and we start the process. He verbalized understanding  Diabetes mellitus. He started Ozempic last visit he is down 10 pounds his sugars at home been averaging about 140 and he is tolerating the medicine very well. Following low glycemic index. Good job  URI: Complains of some sore throat some phlegm drainage symptoms started Friday yellow sputum production. Review of Systems   Constitutional: Negative for fever. HENT: Positive for rhinorrhea and sore throat. Negative for congestion and ear pain. Respiratory: Negative for shortness of breath and wheezing. Cardiovascular: Negative for chest pain and leg swelling. Gastrointestinal: Negative for abdominal pain. Genitourinary: Negative for dysuria. Musculoskeletal: Negative for arthralgias and joint swelling. Skin: Negative for rash. Neurological: Negative for syncope. Hematological: Negative for adenopathy. Psychiatric/Behavioral: Negative for sleep disturbance. The patient is nervous/anxious. Objective:     Physical Exam  Vitals and nursing note reviewed. Constitutional:       General: He is not in acute distress. Appearance: Normal appearance. HENT:      Head: Normocephalic. Right Ear: Tympanic membrane and external ear normal.      Left Ear: Tympanic membrane and external ear normal.      Nose: Congestion present. Mouth/Throat:      Mouth: Mucous membranes are moist.      Pharynx: Oropharyngeal exudate and posterior oropharyngeal erythema present.    Eyes:      Conjunctiva/sclera: Conjunctivae normal.   Cardiovascular:      Rate and Rhythm: Normal rate and regular rhythm. Heart sounds: Normal heart sounds. Pulmonary:      Effort: Pulmonary effort is normal.      Breath sounds: Normal breath sounds. Musculoskeletal:      Cervical back: Normal range of motion. Right lower leg: No edema. Left lower leg: No edema. Comments: Examination right elbow shows no effusion and very minimal tenderness   Skin:     General: Skin is warm and dry. Neurological:      General: No focal deficit present. Mental Status: He is alert and oriented to person, place, and time. Psychiatric:         Mood and Affect: Mood normal.         Behavior: Behavior normal.         Assessment/Plan:     1. Acute pharyngitis, unspecified etiology    2. Cough    3. Type 2 diabetes mellitus without complication, without long-term current use of insulin ([facility]          Lisette Ernandez was seen today for elbow pain, diabetes and cough. Diagnoses and all orders for this visit:    Acute pharyngitis, unspecified etiology  -     amoxicillin (AMOXIL) 500 MG capsule; Take 1 capsule by mouth three times daily for 10 days TAKE UNTIL EMPTY    Cough  -     promethazine-codeine (PHENERGAN WITH CODEINE) 6.25-10 MG/5ML syrup; Take 5 mLs by mouth every 4 hours as needed for Cough for up to 7 days. Type 2 diabetes mellitus without complication, without long-term current use of insulin (HCC)  -     Semaglutide,0.25 or 0.5MG/DOS, (OZEMPIC, 0.25 OR 0.5 MG/DOSE,) 2 MG/1.5ML SOPN; Inject 0.5 mg into the skin once a week    Continue low glycemic index weight loss and Ozempic. Return to office in 2 months we will recheck his A1c at that point. Frederic Jorge MD    Please note that this chart was generated using voice recognition Dragon dictation software. Although every effort was made to ensure the accuracy of this automated transcription, some errors in transcription may have occurred.

## 2022-04-20 NOTE — PATIENT INSTRUCTIONS
Listerine gargles 3 times daily  Vitamin D 10,000 international units daily for 3 days  Vitamin C 1000 mg daily  Steam inhalation, rest

## 2022-04-22 ASSESSMENT — ENCOUNTER SYMPTOMS
WHEEZING: 0
SHORTNESS OF BREATH: 0
ABDOMINAL PAIN: 0
RHINORRHEA: 1
SORE THROAT: 1

## 2022-05-13 ENCOUNTER — TELEPHONE (OUTPATIENT)
Dept: PRIMARY CARE CLINIC | Age: 67
End: 2022-05-13

## 2022-05-13 RX ORDER — GLIMEPIRIDE 2 MG/1
TABLET ORAL
Qty: 180 TABLET | Refills: 0 | Status: SHIPPED | OUTPATIENT
Start: 2022-05-13 | End: 2022-07-07

## 2022-05-13 NOTE — TELEPHONE ENCOUNTER
Penelope Gerardo PT and left voice message to have his 06/27/2022 appt with Dr Pam Tomas rescheduled.

## 2022-05-13 NOTE — TELEPHONE ENCOUNTER
Pt Called back to schedule new appt 07/18/2022 at 11:45 from the appt on 06/27/2022 with Dr Pam Tomas

## 2022-06-09 ENCOUNTER — OFFICE VISIT (OUTPATIENT)
Dept: PRIMARY CARE CLINIC | Age: 67
End: 2022-06-09
Payer: MEDICARE

## 2022-06-09 ENCOUNTER — NURSE TRIAGE (OUTPATIENT)
Dept: OTHER | Facility: CLINIC | Age: 67
End: 2022-06-09

## 2022-06-09 ENCOUNTER — TELEPHONE (OUTPATIENT)
Dept: PRIMARY CARE CLINIC | Age: 67
End: 2022-06-09

## 2022-06-09 VITALS
DIASTOLIC BLOOD PRESSURE: 70 MMHG | SYSTOLIC BLOOD PRESSURE: 116 MMHG | HEIGHT: 68 IN | BODY MASS INDEX: 32.28 KG/M2 | OXYGEN SATURATION: 94 % | HEART RATE: 44 BPM | WEIGHT: 213 LBS | TEMPERATURE: 97.5 F

## 2022-06-09 DIAGNOSIS — R05.3 PERSISTENT COUGH FOR 3 WEEKS OR LONGER: ICD-10-CM

## 2022-06-09 DIAGNOSIS — R06.2 WHEEZING: ICD-10-CM

## 2022-06-09 DIAGNOSIS — J44.1 COPD WITH ACUTE EXACERBATION (HCC): Primary | ICD-10-CM

## 2022-06-09 PROCEDURE — 1123F ACP DISCUSS/DSCN MKR DOCD: CPT

## 2022-06-09 PROCEDURE — 99213 OFFICE O/P EST LOW 20 MIN: CPT

## 2022-06-09 PROCEDURE — 3023F SPIROM DOC REV: CPT

## 2022-06-09 PROCEDURE — 1036F TOBACCO NON-USER: CPT

## 2022-06-09 PROCEDURE — 3017F COLORECTAL CA SCREEN DOC REV: CPT

## 2022-06-09 PROCEDURE — G8417 CALC BMI ABV UP PARAM F/U: HCPCS

## 2022-06-09 PROCEDURE — G8427 DOCREV CUR MEDS BY ELIG CLIN: HCPCS

## 2022-06-09 RX ORDER — ALBUTEROL SULFATE 90 UG/1
2 AEROSOL, METERED RESPIRATORY (INHALATION) EVERY 6 HOURS PRN
Qty: 18 G | Refills: 3 | Status: SHIPPED | OUTPATIENT
Start: 2022-06-09

## 2022-06-09 RX ORDER — PROMETHAZINE HYDROCHLORIDE AND CODEINE PHOSPHATE 6.25; 1 MG/5ML; MG/5ML
5 SYRUP ORAL EVERY 4 HOURS PRN
Qty: 100 ML | Refills: 0 | Status: SHIPPED | OUTPATIENT
Start: 2022-06-09 | End: 2022-06-14

## 2022-06-09 RX ORDER — PREDNISONE 20 MG/1
40 TABLET ORAL DAILY
Qty: 10 TABLET | Refills: 0 | Status: SHIPPED | OUTPATIENT
Start: 2022-06-09 | End: 2022-06-14

## 2022-06-09 RX ORDER — AMOXICILLIN AND CLAVULANATE POTASSIUM 875; 125 MG/1; MG/1
1 TABLET, FILM COATED ORAL 2 TIMES DAILY
Qty: 14 TABLET | Refills: 0 | Status: SHIPPED | OUTPATIENT
Start: 2022-06-09 | End: 2022-06-16

## 2022-06-09 RX ORDER — ALBUTEROL SULFATE 2.5 MG/3ML
2.5 SOLUTION RESPIRATORY (INHALATION) EVERY 6 HOURS PRN
Qty: 120 EACH | Refills: 0 | Status: SHIPPED | OUTPATIENT
Start: 2022-06-09

## 2022-06-09 ASSESSMENT — PATIENT HEALTH QUESTIONNAIRE - PHQ9
SUM OF ALL RESPONSES TO PHQ QUESTIONS 1-9: 0
2. FEELING DOWN, DEPRESSED OR HOPELESS: 0
SUM OF ALL RESPONSES TO PHQ9 QUESTIONS 1 & 2: 0
1. LITTLE INTEREST OR PLEASURE IN DOING THINGS: 0
SUM OF ALL RESPONSES TO PHQ QUESTIONS 1-9: 0

## 2022-06-09 ASSESSMENT — ENCOUNTER SYMPTOMS
WHEEZING: 1
HEMOPTYSIS: 0
SHORTNESS OF BREATH: 1
EYE DISCHARGE: 0
COUGH: 1
CHEST TIGHTNESS: 1
SORE THROAT: 0
RHINORRHEA: 1

## 2022-06-09 NOTE — TELEPHONE ENCOUNTER
Received call from [facility] at [facility] with [employer]. Subjective: Caller states \"Cough for 3 weeks\"     Current Symptoms: Persistent Cough, productive, light green  SOB with coughing fit  Wheezing  Achy in chest with coughing and with deep breaths  Sinus pressure  Eye pressure    Onset: 3 weeks ago    Pain Severity: Mild achy sinus area and occasional h/a - not currently    Temperature: Has not taken temp but does feel chills and sweats, last time was last night    What has been tried: Nebulizer, inhaler, humidifier, steam    Recommended disposition: See in Office Today    Care advice provided, patient verbalizes understanding; denies any other questions or concerns; instructed to call back for any new or worsening symptoms. Patient/Caller agrees with recommended disposition; writer provided warm transfer to [facility] for appointment scheduling     Attention Provider: Thank you for allowing me to participate in the care of your patient. The patient was connected to triage in response to information provided to the ECC/PSC. Please do not respond through this encounter as the response is not directed to a shared pool.     Reason for Disposition   Fever > 100.0 F (37.8 C) and has diabetes mellitus or a weak immune system (e.g., HIV positive, cancer chemotherapy, organ transplant, splenectomy, chronic steroids)    Protocols used: COUGH-ADULT-OH

## 2022-06-09 NOTE — TELEPHONE ENCOUNTER
Patient calling for an appt for bad cough, he was advised the walk in and said he will stop in today

## 2022-06-09 NOTE — PATIENT INSTRUCTIONS
Finish the entire course of antibiotic treatment. Complete short course of oral steroids. Use cough medication as needed. Use Albuterol inhaler as needed for wheezing/shortness of breath. Follow-up with PCP. Patient Education        COPD Exacerbation Plan: Care Instructions  Overview     If you have chronic obstructive pulmonary disease (COPD), your usual shortness of breath could suddenly get worse. You may start coughing more and have more mucus. This flare-up is called a COPD exacerbation (say \"[id number]-[id number]-raman\"). A lung infection or air pollution could set off an exacerbation. Sometimes it can happen after being around chemicals. Work with your doctor to make a plan for dealing with an exacerbation. You can better manage it if you plan ahead. Follow-up care is a key part of your treatment and safety. Be sure to make and go to all appointments, and call your doctor if you are having problems. It's also a good idea to know your test results and keep a list of the medicines you take. How can you care for yourself at home? During an exacerbation   Do not panic if you start to have one. Quick treatment may help you prevent serious breathing problems. If you have a COPD exacerbation plan that you developed with your doctor, follow it.  Take your medicines exactly as your doctor tells you.  ? Use your inhaler as directed by your doctor. If your symptoms do not get better after you use your medicine, have someone take you to the emergency room. Call an ambulance if necessary. ? With inhaled medicines, a spacer or a nebulizer may help you get more medicine to your lungs. Ask your doctor or pharmacist how to use them properly. Practice using the spacer in front of a mirror before you have an exacerbation. This may help you get the medicine into your lungs quickly. ? If your doctor has given you steroid pills, take them as directed. ?  Your doctor may have given you a prescription for antibiotics, which you can fill if you need it. ? Talk to your doctor if you have any problems with your medicine. And call your doctor if you have to use your antibiotic or steroid pills. Preventing an exacerbation   Do not smoke. This is the most important step you can take to prevent more damage to your lungs and prevent problems. If you already smoke, it is never too late to stop. If you need help quitting, talk to your doctor about stop-smoking programs and medicines. These may increase your chances of quitting for good.  Take your daily medicines as prescribed.  Avoid colds and other infections. ? Get a flu vaccine each year, as soon as it is available. Ask those you live or work with to do the same, so they will not get the flu and infect you.  ? Get the pneumococcal and whooping cough (pertussis) vaccines. ? Try to stay away from people with colds or the flu. ? Wash your hands often.  Avoid secondhand smoke and air pollution. Try to stay inside with your windows closed when air pollution is bad.  Learn breathing techniques for COPD, such as pursed-lip breathing. These techniques may help you breathe easier during an exacerbation. When should you call for help? Call 911 anytime you think you may need emergency care. For example, call if:     You have severe trouble breathing.      You have severe chest pain. Call your doctor now or seek immediate medical care if:     You have new or worse shortness of breath.      You develop new chest pain.      You are coughing more deeply or more often, especially if you notice more mucus or a change in the color of your mucus.      You cough up blood.      You have new or increased swelling in your legs or belly.      You have a fever. Watch closely for changes in your health, and be sure to contact your doctor if:     You need to use your antibiotic or steroid pills.      Your symptoms are getting worse. Where can you learn more?   Go to [email or web address] and sign in to your [employer] account. Enter J690 in the [employer] box to learn more about \"COPD Exacerbation Plan: Care Instructions. \"     If you do not have an account, please click on the \"Sign Up Now\" link. Current as of: July 6, 2021               Content Version: 13.2  © 2666-8401 [employer], [facility]. Care instructions adapted under license by [facility] ([facility]). If you have questions about a medical condition or this instruction, always ask your healthcare professional. [place] [address] any warranty or liability for your use of this information.

## 2022-06-09 NOTE — PROGRESS NOTES
[facility] [address] WALK-IN  [address] [place] [address] [phone] [id number]  Dept: [phone]    Nikia Leach is a 75 y.o. male Established patient, who presents to the walk-in clinic today with conditions/complaints as noted below:    Chief Complaint   Patient presents with    Cough     patient has persistant cough for about 3 weeks now; sob when coughing, mucus alot, nothing OTC is working, wheezing     Other     fever and chills at night         HPI:     Cough  This is a recurrent problem. The current episode started 1 to 4 weeks ago (about 3 weeks ago). The problem has been unchanged. The problem occurs constantly. The cough is productive of sputum (green). Associated symptoms include chills, headaches, nasal congestion, postnasal drip, rhinorrhea, shortness of breath and wheezing. Pertinent negatives include no chest pain, ear pain, fever, hemoptysis, rash or sore throat. The symptoms are aggravated by lying down. He has tried a beta-agonist inhaler (steam) for the symptoms. The treatment provided mild relief. His past medical history is significant for COPD. Patient has received the J&J COVID-19 vaccine as well as his booster. Past Medical History:   Diagnosis Date    COPD (chronic obstructive pulmonary disease) (HCC)     Diabetes mellitus (HCC)     Hypertension        Current Outpatient Medications   Medication Sig Dispense Refill    predniSONE (DELTASONE) 20 MG tablet Take 2 tablets by mouth daily for 5 days 10 tablet 0    albuterol sulfate HFA (PROVENTIL HFA) 108 (90 Base) MCG/ACT inhaler Inhale 2 puffs into the lungs every 6 hours as needed for Wheezing 18 g 3    promethazine-codeine (PHENERGAN WITH CODEINE) 6.25-10 MG/5ML syrup Take 5 mLs by mouth every 4 hours as needed for Cough for up to 5 days.  100 mL 0    amoxicillin-clavulanate (AUGMENTIN) 875-125 MG per tablet Take 1 tablet by mouth 2 times daily for 7 days 14 tablet 0    albuterol (PROVENTIL) (2.5 MG/3ML) 0.083% nebulizer solution Take 3 mLs by nebulization every 6 hours as needed for Wheezing or Shortness of Breath 120 each 0    glimepiride (AMARYL) 2 MG tablet TAKE 2 TABLETS BY MOUTH IN THE MORNING AND  2  TABLETS  AT  NIGHT 180 tablet 0    Semaglutide,0.25 or 0.5MG/DOS, (OZEMPIC, 0.25 OR 0.5 MG/DOSE,) 2 MG/1.5ML SOPN Inject 0.5 mg into the skin once a week 1 pen 0    simvastatin (ZOCOR) 40 MG tablet Take 40 mg by mouth nightly      Semaglutide,0.25 or 0.5MG/DOS, (OZEMPIC, 0.25 OR 0.5 MG/DOSE,) 2 MG/1.5ML SOPN Inject 0.25 mg into the skin once a week 1.5 mL 0    lisinopril (PRINIVIL;ZESTRIL) 10 MG tablet Take 1 tablet by mouth daily 90 tablet 3    lisinopril-hydroCHLOROthiazide (PRINZIDE;ZESTORETIC) 20-25 MG per tablet Take 1 tablet by mouth once daily 90 tablet 2    metFORMIN (GLUCOPHAGE-XR) 500 MG extended release tablet TAKE TWO TABS WITH BREAKFAST AND TWO TABLETS WITH SUPPER 360 tablet 1    amLODIPine (NORVASC) 10 MG tablet Take 10 mg by mouth daily      carvedilol (COREG) 6.25 MG tablet Take 6.25 mg by mouth 2 times daily      clopidogrel (PLAVIX) 75 MG tablet Take 75 mg by mouth daily      colestipol (COLESTID) 1 g tablet Take 1 tablet by mouth daily 60 tablet 3     No current facility-administered medications for this visit. No Known Allergies    :     Review of Systems   Constitutional: Positive for chills. Negative for fever. HENT: Positive for congestion, postnasal drip and rhinorrhea. Negative for ear pain and sore throat. Eyes: Negative for discharge. Respiratory: Positive for cough, chest tightness, shortness of breath and wheezing. Negative for hemoptysis. Cardiovascular: Negative for chest pain. Skin: Negative for rash. Neurological: Positive for headaches.  Negative for weakness.       :     /70   Pulse (!) 44   Temp 97.5 °F (36.4 °C)   Ht 5' 8\" (1.727 m)   Wt 213 lb (96.6 kg)   SpO2 94%   BMI 32.39 kg/m²     Physical Exam  Vitals reviewed. Constitutional:       General: He is not in acute distress. Appearance: Normal appearance. He is obese. He is not toxic-appearing. HENT:      Head: Normocephalic and atraumatic. Right Ear: Tympanic membrane, ear canal and external ear normal.      Left Ear: Tympanic membrane, ear canal and external ear normal.      Nose: Nose normal. No congestion or rhinorrhea. Mouth/Throat:      Mouth: Mucous membranes are moist.      Pharynx: Oropharynx is clear. Uvula midline. Posterior oropharyngeal erythema present. Eyes:      Conjunctiva/sclera: Conjunctivae normal.      Pupils: Pupils are equal, round, and reactive to light. Cardiovascular:      Rate and Rhythm: Normal rate and regular rhythm. Heart sounds: Normal heart sounds. No murmur heard. Pulmonary:      Effort: Pulmonary effort is normal. No tachypnea or respiratory distress. Breath sounds: Decreased breath sounds (throughout) and wheezing (throughout) present. No rhonchi or rales. Musculoskeletal:         General: Normal range of motion. Cervical back: Neck supple. Lymphadenopathy:      Cervical: No cervical adenopathy. Skin:     General: Skin is warm and dry. Findings: No rash. Neurological:      General: No focal deficit present. Mental Status: He is alert and oriented to person, place, and time. Psychiatric:         Mood and Affect: Mood normal.         Behavior: Behavior normal.           :          1. COPD with acute exacerbation (HCC)  -     predniSONE (DELTASONE) 20 MG tablet; Take 2 tablets by mouth daily for 5 days, Disp-10 tablet, R-0Normal  -     albuterol sulfate HFA (PROVENTIL HFA) 108 (90 Base) MCG/ACT inhaler;  Inhale 2 puffs into the lungs every 6 hours as needed for Wheezing, Disp-18 g, R-3Normal  -     amoxicillin-clavulanate (AUGMENTIN) 875-125 MG per tablet; Take 1 tablet by mouth 2 times daily for 7 days, Disp-14 tablet, R-0Normal  -     albuterol (PROVENTIL) (2.5 MG/3ML) 0.083% nebulizer solution; Take 3 mLs by nebulization every 6 hours as needed for Wheezing or Shortness of Breath, Disp-120 each, R-0Normal  2. Wheezing  -     predniSONE (DELTASONE) 20 MG tablet; Take 2 tablets by mouth daily for 5 days, Disp-10 tablet, R-0Normal  -     albuterol sulfate HFA (PROVENTIL HFA) 108 (90 Base) MCG/ACT inhaler; Inhale 2 puffs into the lungs every 6 hours as needed for Wheezing, Disp-18 g, R-3Normal  -     albuterol (PROVENTIL) (2.5 MG/3ML) 0.083% nebulizer solution; Take 3 mLs by nebulization every 6 hours as needed for Wheezing or Shortness of Breath, Disp-120 each, R-0Normal  3. Persistent cough for 3 weeks or longer  -     promethazine-codeine (PHENERGAN WITH CODEINE) 6.25-10 MG/5ML syrup; Take 5 mLs by mouth every 4 hours as needed for Cough for up to 5 days. , Disp-100 mL, R-0Normal  -     amoxicillin-clavulanate (AUGMENTIN) 875-125 MG per tablet; Take 1 tablet by mouth 2 times daily for 7 days, Disp-14 tablet, R-0Normal       :      Return if symptoms worsen or fail to improve. Orders Placed This Encounter   Medications    predniSONE (DELTASONE) 20 MG tablet     Sig: Take 2 tablets by mouth daily for 5 days     Dispense:  10 tablet     Refill:  0    albuterol sulfate HFA (PROVENTIL HFA) 108 (90 Base) MCG/ACT inhaler     Sig: Inhale 2 puffs into the lungs every 6 hours as needed for Wheezing     Dispense:  18 g     Refill:  3    promethazine-codeine (PHENERGAN WITH CODEINE) 6.25-10 MG/5ML syrup     Sig: Take 5 mLs by mouth every 4 hours as needed for Cough for up to 5 days.      Dispense:  100 mL     Refill:  0     Reduce doses taken as pain becomes manageable    amoxicillin-clavulanate (AUGMENTIN) 875-125 MG per tablet     Sig: Take 1 tablet by mouth 2 times daily for 7 days     Dispense:  14 tablet     Refill:  0    albuterol (PROVENTIL) (2.5 MG/3ML) 0.083% nebulizer solution     Sig: Take 3 mLs by nebulization every 6 hours as needed for Wheezing or Shortness of Breath     Dispense:  120 each     Refill:  0      Instructed to finish the entire course of antibiotic treatment. Complete short course of oral steroids. Advised to closely monitor blood sugar levels. Continue using albuterol inhaler/nebulizer solution as needed for wheezing or shortness of breath. [place]AR[place] reviewed. May use cough medication as needed. Advised patient on potential for sedation, no driving/machinery use, or alcohol while taking the medication. Seek medical attention immediately for worsening shortness of breath, chest pain, difficulty breathing, or other concerning symptoms. Follow-up with PCP. Patient and/or parent given educational materials - see patient instructions. Discussed use, benefit, and side effects of prescribed medications. All patient questions answered. Patient and/or parent voiced understanding.       Electronically signed by CHRISTOPHER Nur 6/9/2022 at 1:45 PM

## 2022-06-17 RX ORDER — METFORMIN HYDROCHLORIDE 500 MG/1
TABLET, EXTENDED RELEASE ORAL
Qty: 360 TABLET | Refills: 0 | Status: SHIPPED | OUTPATIENT
Start: 2022-06-17 | End: 2022-08-24

## 2022-07-07 RX ORDER — GLIMEPIRIDE 2 MG/1
TABLET ORAL
Qty: 180 TABLET | Refills: 0 | Status: SHIPPED | OUTPATIENT
Start: 2022-07-07 | End: 2022-08-24

## 2022-07-18 ENCOUNTER — OFFICE VISIT (OUTPATIENT)
Dept: PRIMARY CARE CLINIC | Age: 67
End: 2022-07-18
Payer: MEDICARE

## 2022-07-18 VITALS
DIASTOLIC BLOOD PRESSURE: 64 MMHG | OXYGEN SATURATION: 98 % | HEIGHT: 68 IN | WEIGHT: 215.4 LBS | SYSTOLIC BLOOD PRESSURE: 132 MMHG | BODY MASS INDEX: 32.64 KG/M2 | HEART RATE: 72 BPM

## 2022-07-18 DIAGNOSIS — J44.1 COPD WITH ACUTE EXACERBATION (HCC): ICD-10-CM

## 2022-07-18 DIAGNOSIS — J40 BRONCHITIS: Primary | ICD-10-CM

## 2022-07-18 DIAGNOSIS — R06.2 WHEEZING: ICD-10-CM

## 2022-07-18 DIAGNOSIS — E11.9 TYPE 2 DIABETES MELLITUS WITHOUT COMPLICATION, WITHOUT LONG-TERM CURRENT USE OF INSULIN (HCC): ICD-10-CM

## 2022-07-18 PROBLEM — E11.65 TYPE 2 DIABETES MELLITUS WITH HYPERGLYCEMIA (HCC): Status: ACTIVE | Noted: 2022-07-18

## 2022-07-18 LAB — HBA1C MFR BLD: 8.4 %

## 2022-07-18 PROCEDURE — 83036 HEMOGLOBIN GLYCOSYLATED A1C: CPT | Performed by: FAMILY MEDICINE

## 2022-07-18 PROCEDURE — 1123F ACP DISCUSS/DSCN MKR DOCD: CPT | Performed by: FAMILY MEDICINE

## 2022-07-18 PROCEDURE — 99214 OFFICE O/P EST MOD 30 MIN: CPT | Performed by: FAMILY MEDICINE

## 2022-07-18 PROCEDURE — 3052F HG A1C>EQUAL 8.0%<EQUAL 9.0%: CPT | Performed by: FAMILY MEDICINE

## 2022-07-18 RX ORDER — DEXTROMETHORPHAN HYDROBROMIDE AND PROMETHAZINE HYDROCHLORIDE 15; 6.25 MG/5ML; MG/5ML
5 SYRUP ORAL 4 TIMES DAILY PRN
Qty: 120 ML | Refills: 0 | Status: SHIPPED | OUTPATIENT
Start: 2022-07-18 | End: 2022-07-25

## 2022-07-18 RX ORDER — DOXYCYCLINE HYCLATE 100 MG
100 TABLET ORAL 2 TIMES DAILY
Qty: 20 TABLET | Refills: 0 | Status: SHIPPED | OUTPATIENT
Start: 2022-07-18 | End: 2022-07-18 | Stop reason: ALTCHOICE

## 2022-07-18 RX ORDER — DEXTROMETHORPHAN HYDROBROMIDE AND PROMETHAZINE HYDROCHLORIDE 15; 6.25 MG/5ML; MG/5ML
5 SYRUP ORAL 4 TIMES DAILY PRN
Qty: 120 ML | Refills: 0 | Status: SHIPPED | OUTPATIENT
Start: 2022-07-18 | End: 2022-07-18 | Stop reason: ALTCHOICE

## 2022-07-18 RX ORDER — DOXYCYCLINE HYCLATE 100 MG
100 TABLET ORAL 2 TIMES DAILY
Qty: 20 TABLET | Refills: 0 | Status: SHIPPED | OUTPATIENT
Start: 2022-07-18 | End: 2022-07-28

## 2022-07-18 ASSESSMENT — PATIENT HEALTH QUESTIONNAIRE - PHQ9
2. FEELING DOWN, DEPRESSED OR HOPELESS: 0
SUM OF ALL RESPONSES TO PHQ QUESTIONS 1-9: 0
1. LITTLE INTEREST OR PLEASURE IN DOING THINGS: 0
SUM OF ALL RESPONSES TO PHQ9 QUESTIONS 1 & 2: 0
SUM OF ALL RESPONSES TO PHQ QUESTIONS 1-9: 0

## 2022-07-18 ASSESSMENT — ENCOUNTER SYMPTOMS
CONSTIPATION: 0
WHEEZING: 1
BACK PAIN: 0
SORE THROAT: 1
COUGH: 1
SHORTNESS OF BREATH: 1
CHEST TIGHTNESS: 0
DIARRHEA: 0

## 2022-07-18 NOTE — PROGRESS NOTES
Subjective:     Patient ID: Jolanta Villalpando is a 75 y.o. male    HPI  Keila Mckeon returns today complaining of shortness of breath cough wheezing. He is concerned because this is been going on and off for 6 to 8 months with recurrence. He denies history of COPD or asthma but has not had any PFTs done in the past.  He said he has been ascribed a diagnosis of COPD but does not believe he has had any testing. He has only used rescue inhalers and nebulizers. He is producing green sputum  We have a recheck on his A1c today for his diabetes and it is 8.5 today down from last time. We discussed a goal A1c of being in the low sevens with no hypoglycemic episodes. Reviewed his diabetes meds and would like to see him take something for risk reduction. Review of Systems   Constitutional:  Negative for activity change, appetite change, fatigue and fever. HENT:  Positive for congestion and sore throat. Eyes:  Negative for visual disturbance. Respiratory:  Positive for cough, shortness of breath and wheezing. Negative for chest tightness. Cardiovascular:  Negative for chest pain, palpitations and leg swelling. Gastrointestinal:  Negative for constipation and diarrhea. Genitourinary:  Negative for dysuria and urgency. Musculoskeletal:  Negative for back pain. Neurological:  Negative for dizziness, syncope and headaches. Hematological:  Does not bruise/bleed easily. Psychiatric/Behavioral:  Negative for confusion and sleep disturbance. The patient is not nervous/anxious. Objective:     Physical Exam  Vitals and nursing note reviewed. Constitutional:       Appearance: Normal appearance. HENT:      Head: Normocephalic. Right Ear: External ear normal.      Left Ear: External ear normal.      Nose: Congestion present. Mouth/Throat:      Mouth: Mucous membranes are moist.      Pharynx: Posterior oropharyngeal erythema present.    Eyes:      Conjunctiva/sclera: Conjunctivae normal. Pupils: Pupils are equal, round, and reactive to light. Cardiovascular:      Rate and Rhythm: Normal rate and regular rhythm. Pulses:           Dorsalis pedis pulses are 1+ on the right side and 1+ on the left side. Posterior tibial pulses are 1+ on the right side and 1+ on the left side. Heart sounds: Normal heart sounds. No murmur heard. Pulmonary:      Effort: Pulmonary effort is normal. No respiratory distress. Breath sounds: Wheezing and rhonchi present. No rales. Chest:      Chest wall: No tenderness. Musculoskeletal:         General: Normal range of motion. Cervical back: Neck supple. Right lower leg: No edema. Left lower leg: No edema. Feet:      Right foot:      Protective Sensation: 10 sites tested. 10 sites sensed. Skin integrity: Skin integrity normal.      Left foot:      Protective Sensation: 10 sites tested. 10 sites sensed. Skin integrity: Skin integrity normal.   Lymphadenopathy:      Cervical: No cervical adenopathy. Skin:     General: Skin is warm and dry. Capillary Refill: Capillary refill takes less than 2 seconds. Neurological:      General: No focal deficit present. Mental Status: He is alert and oriented to person, place, and time. Psychiatric:         Mood and Affect: Mood normal.         Behavior: Behavior normal.     Component Value Date    LABA1C 8.4 07/18/2022     No results found for: EAG     Assessment/Plan:     1. Bronchitis    2. Wheezing    3. COPD with acute exacerbation ([facility]    4. Type 2 diabetes mellitus without complication, without long-term current use of insulin ([facility]          Teresita Bowman was seen today for follow-up and cough. Diagnoses and all orders for this visit:    Bronchitis  -     Full PFT Study With Bronchodilator; Future  -     doxycycline hyclate (VIBRA-TABS) 100 MG tablet; Take 1 tablet by mouth in the morning and 1 tablet before bedtime. Do all this for 10 days.   - promethazine-dextromethorphan (PROMETHAZINE-DM) 6.25-15 MG/5ML syrup; Take 5 mLs by mouth 4 times daily as needed for Cough    Wheezing  -     Full PFT Study With Bronchodilator; Future    COPD with acute exacerbation ([facility]  -     Full PFT Study With Bronchodilator; Future    Type 2 diabetes mellitus without complication, without long-term current use of insulin (HCC)  -     POCT glycosylated hemoglobin (Hb A1C)  -      DIABETES FOOT EXAM    Continue metformin 2000 mg daily. Continue glimepiride in the short-term. Pharm. D. consult is done with our intern found that Nga Daniel is on his insurance plan. Will prescribe 10 mg    Return to office in 3 months. Jamaal Lima MD    Please note that this chart was generated using voice recognition Dragon dictation software. Although every effort was made to ensure the accuracy of this automated transcription, some errors in transcription may have occurred.

## 2022-07-20 ENCOUNTER — HOSPITAL ENCOUNTER (OUTPATIENT)
Age: 67
Setting detail: SPECIMEN
Discharge: HOME | End: 2022-07-20

## 2022-07-20 LAB
CHOLESTEROL/HDL RATIO: 4
CHOLESTEROL: 120 MG/DL
HDLC SERPL-MCNC: 30 MG/DL
LDL CHOLESTEROL: 20 MG/DL (ref 0–130)
TRIGL SERPL-MCNC: 352 MG/DL

## 2022-08-09 ENCOUNTER — TELEPHONE (OUTPATIENT)
Dept: GASTROENTEROLOGY | Age: 67
End: 2022-08-09

## 2022-08-09 NOTE — TELEPHONE ENCOUNTER
Pt left appointment without being seen due to waiting too long. I called pt to reschedule and left voicemail to call us back.

## 2022-08-24 RX ORDER — GLIMEPIRIDE 2 MG/1
TABLET ORAL
Qty: 180 TABLET | Refills: 0 | Status: SHIPPED | OUTPATIENT
Start: 2022-08-24 | End: 2022-10-04

## 2022-08-24 RX ORDER — METFORMIN HYDROCHLORIDE 500 MG/1
TABLET, EXTENDED RELEASE ORAL
Qty: 360 TABLET | Refills: 0 | Status: SHIPPED | OUTPATIENT
Start: 2022-08-24 | End: 2022-10-05

## 2022-08-24 NOTE — TELEPHONE ENCOUNTER
Patient last seen by Carlos Ku 7/18/22. Last office note states return in 3 months. Appointment scheduled for 10/20/22.

## 2022-08-30 ENCOUNTER — HOSPITAL ENCOUNTER (OUTPATIENT)
Dept: PULMONOLOGY | Age: 67
Discharge: HOME | End: 2022-08-30
Payer: MEDICARE

## 2022-08-30 DIAGNOSIS — J44.1 COPD WITH ACUTE EXACERBATION (HCC): ICD-10-CM

## 2022-08-30 DIAGNOSIS — J40 BRONCHITIS: ICD-10-CM

## 2022-08-30 DIAGNOSIS — R06.2 WHEEZING: ICD-10-CM

## 2022-08-30 LAB
DLCO %PRED: 73 %
DLCO PRED: NORMAL
DLCO/VA %PRED: NORMAL
DLCO/VA PRED: NORMAL
DLCO/VA: NORMAL
DLCO: NORMAL
EXPIRATORY TIME-POST: NORMAL
EXPIRATORY TIME: NORMAL
FEF 25-75% %CHNG: NORMAL
FEF 25-75% %PRED-POST: NORMAL
FEF 25-75% %PRED-PRE: NORMAL
FEF 25-75% PRED: NORMAL
FEF 25-75%-POST: NORMAL
FEF 25-75%-PRE: NORMAL
FEV1 %PRED-POST: 63 %
FEV1 %PRED-PRE: 61 %
FEV1 PRED: NORMAL
FEV1-POST: NORMAL
FEV1-PRE: NORMAL
FEV1/FVC %PRED-POST: NORMAL
FEV1/FVC %PRED-PRE: NORMAL
FEV1/FVC PRED: NORMAL
FEV1/FVC-POST: 60 %
FEV1/FVC-PRE: 67 %
FVC %PRED-POST: NORMAL
FVC %PRED-PRE: NORMAL
FVC PRED: NORMAL
FVC-POST: NORMAL
FVC-PRE: NORMAL
GAW %PRED: NORMAL
GAW PRED: NORMAL
GAW: NORMAL
IC %PRED: NORMAL
IC PRED: NORMAL
IC: NORMAL
MEP: NORMAL
MIP: NORMAL
MVV %PRED-PRE: NORMAL
MVV PRED: NORMAL
MVV-PRE: NORMAL
PEF %PRED-POST: NORMAL
PEF %PRED-PRE: NORMAL
PEF PRED: NORMAL
PEF%CHNG: NORMAL
PEF-POST: NORMAL
PEF-PRE: NORMAL
RAW %PRED: NORMAL
RAW PRED: NORMAL
RAW: NORMAL
RV %PRED: NORMAL
RV PRED: NORMAL
RV: NORMAL
SVC %PRED: NORMAL
SVC PRED: NORMAL
SVC: NORMAL
TLC %PRED: 90 %
TLC PRED: NORMAL
TLC: NORMAL
VA %PRED: NORMAL
VA PRED: NORMAL
VA: NORMAL
VTG %PRED: NORMAL
VTG PRED: NORMAL
VTG: NORMAL

## 2022-08-30 PROCEDURE — 94729 DIFFUSING CAPACITY: CPT

## 2022-08-30 PROCEDURE — 94060 EVALUATION OF WHEEZING: CPT

## 2022-08-30 PROCEDURE — 6370000000 HC RX 637 (ALT 250 FOR IP): Performed by: FAMILY MEDICINE

## 2022-08-30 PROCEDURE — 94726 PLETHYSMOGRAPHY LUNG VOLUMES: CPT

## 2022-08-30 RX ORDER — ALBUTEROL SULFATE 90 UG/1
2 AEROSOL, METERED RESPIRATORY (INHALATION) ONCE
Status: COMPLETED | OUTPATIENT
Start: 2022-08-30 | End: 2022-08-30

## 2022-08-30 RX ADMIN — ALBUTEROL SULFATE 2 PUFF: 90 AEROSOL, METERED RESPIRATORY (INHALATION) at 15:23

## 2022-08-30 ASSESSMENT — PULMONARY FUNCTION TESTS
FEV1_PERCENT_PREDICTED_PRE: 61
FEV1/FVC_PRE: 67
FEV1/FVC_POST: 60
FEV1_PERCENT_PREDICTED_POST: 63

## 2022-08-30 NOTE — PROCEDURES
Interpretation:  Moderate obstructive lung disease with air trapping and mild diffusion impairment. Significant improvement was noted after bronchodilators in FVC only. Clinical correlation is recommended.

## 2022-10-04 RX ORDER — GLIMEPIRIDE 2 MG/1
TABLET ORAL
Qty: 180 TABLET | Refills: 0 | Status: SHIPPED | OUTPATIENT
Start: 2022-10-04

## 2022-10-04 NOTE — TELEPHONE ENCOUNTER
Last visit: 7/18/22  Last Med refill: 8/24/22    Next Visit Date:  Future Appointments   Date Time Provider Papo Bakeri   10/20/2022  7:00 AM Robbin Reid MD [facility]   Topic Date Due    Pneumococcal 65+ years Vaccine (1 - PCV) Never done    Diabetic microalbuminuria test  Never done    Diabetic retinal exam  Never done    Hepatitis C screen  Never done    Annual Wellness Visit (AWV)  Never done    COVID-19 Vaccine (3 - Booster for Bernard series) 03/18/2022    Flu vaccine (1) Never done    DTaP/Tdap/Td vaccine (1 - Tdap) 06/09/2023 (Originally 5/28/1974)    Shingles vaccine (1 of 2) 06/09/2023 (Originally 5/28/2004)    Diabetic foot exam  07/18/2023    A1C test (Diabetic or Prediabetic)  07/18/2023    Depression Screen  07/18/2023    Lipids  07/20/2023    Colorectal Cancer Screen  11/29/2024    Hepatitis A vaccine  Aged Out    Hib vaccine  Aged Out    Meningococcal (ACWY) vaccine  Aged Out       Hemoglobin A1C (%)   Date Value   07/18/2022 8.4   03/23/2022 9.4   02/02/2021 8.5             ( goal A1C is < 7)   No results found for: LABMICR  LDL Cholesterol (mg/dL)   Date Value   07/20/2022 20       (goal LDL is <100)   AST (U/L)   Date Value   11/01/2021 20     ALT (U/L)   Date Value   11/01/2021 31     BP Readings from Last 3 Encounters:   07/18/22 132/64   06/09/22 116/70   04/20/22 108/60          (goal 120/80)    All Future Testing planned in [facility]  Lab Frequency Next Occurrence               Patient Active Problem List:     COPD with exacerbation ([facility]     Coronary atherosclerosis     Diabetes mellitus ([facility]     Dyslipidemia     Hypertension     Wheezing     Type 2 diabetes mellitus with hyperglycemia

## 2022-10-05 RX ORDER — METFORMIN HYDROCHLORIDE 500 MG/1
TABLET, EXTENDED RELEASE ORAL
Qty: 360 TABLET | Refills: 0 | Status: SHIPPED | OUTPATIENT
Start: 2022-10-05

## 2022-10-05 NOTE — TELEPHONE ENCOUNTER
Last visit: 7/18/22  Last Med refill: 8/24/22      Next Visit Date:  Future Appointments   Date Time Provider Papo Bakeri   10/20/2022  7:00 AM Ashlyn Hooker MD [facility]   Topic Date Due    Pneumococcal 65+ years Vaccine (1 - PCV) Never done    Diabetic microalbuminuria test  Never done    Diabetic retinal exam  Never done    Hepatitis C screen  Never done    Annual Wellness Visit (AWV)  Never done    COVID-19 Vaccine (3 - Booster for Bernard series) 03/18/2022    Flu vaccine (1) Never done    DTaP/Tdap/Td vaccine (1 - Tdap) 06/09/2023 (Originally 5/28/1974)    Shingles vaccine (1 of 2) 06/09/2023 (Originally 5/28/2004)    Diabetic foot exam  07/18/2023    A1C test (Diabetic or Prediabetic)  07/18/2023    Depression Screen  07/18/2023    Lipids  07/20/2023    Colorectal Cancer Screen  11/29/2024    Hepatitis A vaccine  Aged Out    Hib vaccine  Aged Out    Meningococcal (ACWY) vaccine  Aged Out       Hemoglobin A1C (%)   Date Value   07/18/2022 8.4   03/23/2022 9.4   02/02/2021 8.5             ( goal A1C is < 7)   No results found for: LABMICR  LDL Cholesterol (mg/dL)   Date Value   07/20/2022 20       (goal LDL is <100)   AST (U/L)   Date Value   11/01/2021 20     ALT (U/L)   Date Value   11/01/2021 31     BP Readings from Last 3 Encounters:   07/18/22 132/64   06/09/22 116/70   04/20/22 108/60          (goal 120/80)    All Future Testing planned in [facility]  Lab Frequency Next Occurrence               Patient Active Problem List:     COPD with exacerbation ([address]     Coronary atherosclerosis     Diabetes mellitus ([facility]     Dyslipidemia     Hypertension     Wheezing     Type 2 diabetes mellitus with hyperglycemia         '

## 2022-10-20 ENCOUNTER — PATIENT MESSAGE (OUTPATIENT)
Dept: PRIMARY CARE CLINIC | Age: 67
End: 2022-10-20

## 2022-10-20 ENCOUNTER — OFFICE VISIT (OUTPATIENT)
Dept: PRIMARY CARE CLINIC | Age: 67
End: 2022-10-20
Payer: MEDICARE

## 2022-10-20 VITALS
OXYGEN SATURATION: 96 % | HEIGHT: 68 IN | WEIGHT: 213.6 LBS | SYSTOLIC BLOOD PRESSURE: 122 MMHG | HEART RATE: 73 BPM | DIASTOLIC BLOOD PRESSURE: 68 MMHG | BODY MASS INDEX: 32.37 KG/M2

## 2022-10-20 DIAGNOSIS — I25.2 HISTORY OF ACUTE ANTEROLATERAL MYOCARDIAL INFARCTION: ICD-10-CM

## 2022-10-20 DIAGNOSIS — Z00.00 INITIAL MEDICARE ANNUAL WELLNESS VISIT: Primary | ICD-10-CM

## 2022-10-20 DIAGNOSIS — K52.9 CHRONIC DIARRHEA: ICD-10-CM

## 2022-10-20 DIAGNOSIS — J44.9 CHRONIC OBSTRUCTIVE PULMONARY DISEASE, UNSPECIFIED COPD TYPE (HCC): ICD-10-CM

## 2022-10-20 DIAGNOSIS — Z87.39 HISTORY OF GOUT: ICD-10-CM

## 2022-10-20 DIAGNOSIS — Z91.89 AT RISK FOR CARDIOVASCULAR EVENT: ICD-10-CM

## 2022-10-20 DIAGNOSIS — D58.2 ELEVATED HEMOGLOBIN (HCC): ICD-10-CM

## 2022-10-20 DIAGNOSIS — E78.5 HYPERLIPIDEMIA LDL GOAL <70: ICD-10-CM

## 2022-10-20 DIAGNOSIS — E11.65 TYPE 2 DIABETES MELLITUS WITH HYPERGLYCEMIA, WITHOUT LONG-TERM CURRENT USE OF INSULIN (HCC): ICD-10-CM

## 2022-10-20 DIAGNOSIS — M70.21 OLECRANON BURSITIS OF RIGHT ELBOW: ICD-10-CM

## 2022-10-20 DIAGNOSIS — E78.5 HYPERLIPIDEMIA ASSOCIATED WITH TYPE 2 DIABETES MELLITUS (HCC): ICD-10-CM

## 2022-10-20 DIAGNOSIS — E11.69 HYPERLIPIDEMIA ASSOCIATED WITH TYPE 2 DIABETES MELLITUS (HCC): ICD-10-CM

## 2022-10-20 DIAGNOSIS — G47.30 SLEEP APNEA, UNSPECIFIED TYPE: ICD-10-CM

## 2022-10-20 DIAGNOSIS — E11.65 TYPE 2 DIABETES MELLITUS WITH HYPERGLYCEMIA, WITHOUT LONG-TERM CURRENT USE OF INSULIN (HCC): Primary | ICD-10-CM

## 2022-10-20 LAB — HBA1C MFR BLD: 9.2 %

## 2022-10-20 PROCEDURE — 1123F ACP DISCUSS/DSCN MKR DOCD: CPT | Performed by: FAMILY MEDICINE

## 2022-10-20 PROCEDURE — G0447 BEHAVIOR COUNSEL OBESITY 15M: HCPCS | Performed by: FAMILY MEDICINE

## 2022-10-20 PROCEDURE — G8484 FLU IMMUNIZE NO ADMIN: HCPCS | Performed by: FAMILY MEDICINE

## 2022-10-20 PROCEDURE — 3046F HEMOGLOBIN A1C LEVEL >9.0%: CPT | Performed by: FAMILY MEDICINE

## 2022-10-20 PROCEDURE — 3017F COLORECTAL CA SCREEN DOC REV: CPT | Performed by: FAMILY MEDICINE

## 2022-10-20 PROCEDURE — G0446 INTENS BEHAVE THER CARDIO DX: HCPCS | Performed by: FAMILY MEDICINE

## 2022-10-20 PROCEDURE — 83036 HEMOGLOBIN GLYCOSYLATED A1C: CPT | Performed by: FAMILY MEDICINE

## 2022-10-20 PROCEDURE — G0438 PPPS, INITIAL VISIT: HCPCS | Performed by: FAMILY MEDICINE

## 2022-10-20 ASSESSMENT — LIFESTYLE VARIABLES
HOW OFTEN DO YOU HAVE A DRINK CONTAINING ALCOHOL: MONTHLY OR LESS
HOW MANY STANDARD DRINKS CONTAINING ALCOHOL DO YOU HAVE ON A TYPICAL DAY: 1 OR 2

## 2022-10-20 ASSESSMENT — PATIENT HEALTH QUESTIONNAIRE - PHQ9
1. LITTLE INTEREST OR PLEASURE IN DOING THINGS: 0
2. FEELING DOWN, DEPRESSED OR HOPELESS: 0
SUM OF ALL RESPONSES TO PHQ QUESTIONS 1-9: 0
SUM OF ALL RESPONSES TO PHQ9 QUESTIONS 1 & 2: 0
SUM OF ALL RESPONSES TO PHQ QUESTIONS 1-9: 0

## 2022-10-20 NOTE — TELEPHONE ENCOUNTER
From: Catia Sellers  To: Dr. Lay Angelo: 10/20/2022 1:06 PM EDT  Subject: Ozempic or Trulicity    could you place a order with [facility] for your choice of Ozempic or Trulicity . Seems to be same cost as [place].  Thanks Catia Sellers

## 2022-10-20 NOTE — Clinical Note
Please let patient know I did an extensive review of his chart and he does need some additional blood work done at this time.   Thanks for your help as always

## 2022-10-20 NOTE — PROGRESS NOTES
Subjective:     Patient ID: Shayy Nash is a 75 y.o. male    HPI  Patient returns today for an initial annual Medicare wellness visit. The records do not show a previous visit and he is pretty sure he has never had 1. Explained the purpose of the visit and went through the screens with him which she did well with. Past medical history family history social history all reviewed. His diabetes has been running high at home. He had been started on Farxiga. Recall that he has had problems with insurance coverage and expense on his diabetes meds. He is still taking the metformin and the glimepiride. We discussed Trulicity and Ozempic and Mounjaro as options if not cost prohibitive with his insurance coverage. I wrote down the names of the medicines and he was going to call his carrier. We will also ask our Pharm. D. resident to help. Hypertension has been under good control. He denies any chest pain PND palpitations dizziness headache. Takes his lisinopril and milligrams amlodipine 10 mg carvedilol 6.25 twice daily  Had PFTs done August 30 to evaluate his COPD. No interpretation from the doctor on the report. Raw data is reviewed with the patient but will need the physician's interpretation. Quit smoking 23 years ago. Currently uses his albuterol solution on an as-needed basis. We discussed a long-acting bronchodilator and he wishes to defer at this time  He has had some recurrent bronchitis. Recurrent diarrhea for which he takes colestipol from GI  Cardiac history is followed by [facility] in [place]. Old myocardial infarction anterolateral 2009. Had stent placement in 16. Maintained on clopidogrel. Cardiology opinion is that he does not need aspirin  Hyperlipidemia on statin with LDL goal below 70  Also has history of elevated hemoglobin hematocrit which probably represents of reactive polycythemia related to his history of smoking and COPD.   We will need to recheck his CBC and also check erythropoietin  Also has history of gout but no recent exacerbations  Olecranon bursitis right elbow has been stable  Remote history of obstructive sleep apnea  Review of Systems   Constitutional:  Negative for activity change, appetite change, fatigue and fever. HENT:  Negative for sore throat. Eyes:  Negative for visual disturbance. Respiratory:  Negative for cough, chest tightness and shortness of breath. Cardiovascular:  Negative for chest pain, palpitations and leg swelling. Gastrointestinal:  Negative for constipation and diarrhea. Genitourinary:  Negative for dysuria and urgency. Musculoskeletal:  Positive for arthralgias. Negative for back pain. Neurological:  Negative for dizziness, syncope and headaches. Hematological:  Does not bruise/bleed easily. Psychiatric/Behavioral:  Negative for confusion and sleep disturbance. The patient is not nervous/anxious. Objective:     Physical Exam  Constitutional:       Appearance: Normal appearance. He is obese. He is not ill-appearing. HENT:      Head: Normocephalic. Right Ear: External ear normal.      Left Ear: External ear normal.      Nose: Nose normal.      Mouth/Throat:      Mouth: Mucous membranes are moist.      Pharynx: Oropharynx is clear. Eyes:      Conjunctiva/sclera: Conjunctivae normal.   Cardiovascular:      Rate and Rhythm: Normal rate and regular rhythm. Pulses: Normal pulses. Heart sounds: Normal heart sounds. No murmur heard. Pulmonary:      Effort: Pulmonary effort is normal.      Breath sounds: Normal breath sounds. Musculoskeletal:         General: Normal range of motion. Cervical back: Neck supple. Right lower leg: No edema. Left lower leg: No edema. Lymphadenopathy:      Cervical: No cervical adenopathy. Skin:     General: Skin is warm and dry. Capillary Refill: Capillary refill takes less than 2 seconds. Neurological:      General: No focal deficit present. statins to reduce cardiac events  Up to 15 minutes was spent on this counseling  Detailed handouts given   BMI 32.0-32.9,adult  -     [place] Behavior  obesity 15m []  -     Basic Metabolic Panel; Future    Recommend 10 % weight loss over 3 months  Low glycemic index diet with portion control  Up to 15 minutes spent on counseling  Handouts with detailed instructions given   Type 2 diabetes mellitus with hyperglycemia, without long-term current use of insulin ([facility]  -     Basic Metabolic Panel; Future  -     Microalbumin, Ur; Future  A1c goal is around 7. We will look at options in regard to help to reduce his cardiac risk as well as good glycemic control  Elevated hemoglobin (HCC)  -     Erythropoietin; Future  -     CBC with Auto Differential; Future  Needs work-up. Has not been able to donate blood  Chronic obstructive pulmonary disease, unspecified COPD type ([facility]  Stable but we need PFT interpretation  History of acute anterolateral myocardial infarction  Stable  Sleep apnea, unspecified type  Stable  Chronic diarrhea  -     Basic Metabolic Panel; Future  Continue colestipol  Hyperlipidemia associated with type 2 diabetes mellitus ([facility]  Continue his statin he is at goal  Hyperlipidemia LDL goal <70    Olecranon bursitis of right elbow  Stable  History of gout  -     Uric Acid; Future            Fuad Mata MD    Please note that this chart was generated using voice recognition Dragon dictation software. Although every effort was made to ensure the accuracy of this automated transcription, some errors in transcription may have occurred.

## 2022-10-20 NOTE — PATIENT INSTRUCTIONS
Personalized Preventive Plan for Mena Ireland - 10/20/2022  Medicare offers a range of preventive health benefits. Some of the tests and screenings are paid in full while other may be subject to a deductible, co-insurance, and/or copay. Some of these benefits include a comprehensive review of your medical history including lifestyle, illnesses that may run in your family, and various assessments and screenings as appropriate. After reviewing your medical record and screening and assessments performed today your provider may have ordered immunizations, labs, imaging, and/or referrals for you. A list of these orders (if applicable) as well as your Preventive Care list are included within your After Visit Summary for your review. Other Preventive Recommendations:    A preventive eye exam performed by an eye specialist is recommended every 1-2 years to screen for glaucoma; cataracts, macular degeneration, and other eye disorders. A preventive dental visit is recommended every 6 months. Try to get at least 150 minutes of exercise per week or 10,000 steps per day on a pedometer . Order or download the FREE \"Exercise & Physical Activity: Your Everyday Guide\" from [employer]. Call 4-615.559.7160 or search [employer] online. You need 0072-4462 mg of calcium and 3343-2798 IU of vitamin D per day. It is possible to meet your calcium requirement with diet alone, but a vitamin D supplement is usually necessary to meet this goal.  When exposed to the sun, use a sunscreen that protects against both UVA and UVB radiation with an SPF of 30 or greater. Reapply every 2 to 3 hours or after sweating, drying off with a towel, or swimming. Always wear a seat belt when traveling in a car. Always wear a helmet when riding a bicycle or motorcycle.

## 2022-10-20 NOTE — Clinical Note
Please review price of trulicity and ozempic for him again   had better weight loss and farxiga is $500 a month    please advise

## 2022-10-22 PROBLEM — I25.2: Status: ACTIVE | Noted: 2022-10-22

## 2022-10-22 PROBLEM — E11.69 HYPERLIPIDEMIA ASSOCIATED WITH TYPE 2 DIABETES MELLITUS (HCC): Status: ACTIVE | Noted: 2022-10-22

## 2022-10-22 PROBLEM — E78.5 HYPERLIPIDEMIA ASSOCIATED WITH TYPE 2 DIABETES MELLITUS (HCC): Status: ACTIVE | Noted: 2022-10-22

## 2022-10-22 PROBLEM — K52.9 CHRONIC DIARRHEA: Status: ACTIVE | Noted: 2022-10-22

## 2022-10-22 PROBLEM — G47.30 SLEEP APNEA: Status: ACTIVE | Noted: 2022-10-22

## 2022-10-22 PROBLEM — M70.21 OLECRANON BURSITIS OF RIGHT ELBOW: Status: ACTIVE | Noted: 2022-10-22

## 2022-10-22 PROBLEM — E78.5 HYPERLIPIDEMIA LDL GOAL <70: Status: ACTIVE | Noted: 2022-10-22

## 2022-10-22 ASSESSMENT — ENCOUNTER SYMPTOMS
COUGH: 0
CONSTIPATION: 0
BACK PAIN: 0
DIARRHEA: 0
SHORTNESS OF BREATH: 0
SORE THROAT: 0
CHEST TIGHTNESS: 0

## 2022-10-24 ENCOUNTER — TELEPHONE (OUTPATIENT)
Dept: PRIMARY CARE CLINIC | Age: 67
End: 2022-10-24

## 2022-10-24 NOTE — TELEPHONE ENCOUNTER
Medication Management Service ([address] [place] [address])  [facility]  [phone]       CLINICAL PHARMACY NOTE:    Kathe Davlaos is a 75year old male that has established care with Zain Edouard MD.    This Scott Ano was requested by Dr. Jude Zuniga to review the patient's insurance coverage regarding Trulicity, Ozempic, and Mounjaro. Of note, all information reflected in this note is based on the patient's prescription insurance plan for 2022. The Medicare Part D Open Enrollment Period for 2023 insurance plans is from October 15 through December 7. Therefore, which medications are preferred as well as the prices quoted below may change for 2023 in addition to if the patient elects to switch plans for this upcoming year. Per [employer]'s online plan tool, both Ozempic and Trulicity are Tier 3 Preferred Brand Name Medications on the plan's formulary. [facility] is not covered on the pan's formulary. Based on the different drug payment phases for Medicare Part D Plans throughout the year, the patient will pay the following in 2022:  Deductible Phase (up to $480):   Patient pays full cost of the medication up to $480  Initial Coverage Phase (up to $4,430): At a preferred pharmacy (such as [facility] where the patient is currently filling), both Ozmepic and Trulicity will cost $10 for a 30 day supply or $138 for a 90 day supply. Coverage Gap Phase or \"Donut Hole\" (up to $7,050): The patient will pay 25% of the medication's cost  Catastrophic Coverage Phase:  The patient pays whichever is greater for brand name drugs- 5% of the medication's cost or a $9.85 co-pay. Both Trulicity and Ozempic will cost the same amount of money throughout the Initial Coverage Phase. However, if the patient where to reach the Coverage Gap Phase and/or the Catastrophic Coverage Phase, Trulicity is the less expensive option per [employer] online plan tool. Therefore, Trulicity is the more economic option based on 2022 plan data.   However, this may change if the patient elects to switch prescription drug plans for 2023. Note routed to Dr. Leigh Jones and Chente Yañez.     Kan Medina, PharmD, [address]  PGY2 Ambulatory Care Pharmacy Resident   [facility]) Medication Management Service  [phone]  10/24/2022  2:53 PM

## 2022-10-25 ENCOUNTER — HOSPITAL ENCOUNTER (OUTPATIENT)
Age: 67
Setting detail: SPECIMEN
Discharge: HOME | End: 2022-10-25

## 2022-10-25 DIAGNOSIS — Z87.39 HISTORY OF GOUT: ICD-10-CM

## 2022-10-25 DIAGNOSIS — E11.65 TYPE 2 DIABETES MELLITUS WITH HYPERGLYCEMIA, WITHOUT LONG-TERM CURRENT USE OF INSULIN (HCC): ICD-10-CM

## 2022-10-25 DIAGNOSIS — D58.2 ELEVATED HEMOGLOBIN (HCC): ICD-10-CM

## 2022-10-25 DIAGNOSIS — K52.9 CHRONIC DIARRHEA: ICD-10-CM

## 2022-10-25 LAB
ABSOLUTE EOS #: 0.07 K/UL (ref 0–0.44)
ABSOLUTE IMMATURE GRANULOCYTE: <0.03 K/UL (ref 0–0.3)
ABSOLUTE LYMPH #: 2.43 K/UL (ref 1.1–3.7)
ABSOLUTE MONO #: 0.59 K/UL (ref 0.1–1.2)
ANION GAP SERPL CALCULATED.3IONS-SCNC: 15 MMOL/L (ref 9–17)
BASOPHILS # BLD: 1 % (ref 0–2)
BASOPHILS ABSOLUTE: 0.06 K/UL (ref 0–0.2)
BUN BLDV-MCNC: 14 MG/DL (ref 8–23)
CALCIUM SERPL-MCNC: 9.7 MG/DL (ref 8.6–10.4)
CHLORIDE BLD-SCNC: 100 MMOL/L (ref 98–107)
CO2: 23 MMOL/L (ref 20–31)
CREAT SERPL-MCNC: 0.7 MG/DL (ref 0.7–1.2)
CREATININE URINE: 52.6 MG/DL (ref 39–259)
EOSINOPHILS RELATIVE PERCENT: 1 % (ref 1–4)
GFR SERPL CREATININE-BSD FRML MDRD: >60 ML/MIN/1.73M2
GLUCOSE BLD-MCNC: 191 MG/DL (ref 70–99)
HCT VFR BLD CALC: 53.1 % (ref 40.7–50.3)
HEMOGLOBIN: 17.3 G/DL (ref 13–17)
IMMATURE GRANULOCYTES: 0 %
LYMPHOCYTES # BLD: 25 % (ref 24–43)
MCH RBC QN AUTO: 29.2 PG (ref 25.2–33.5)
MCHC RBC AUTO-ENTMCNC: 32.6 G/DL (ref 28.4–34.8)
MCV RBC AUTO: 89.5 FL (ref 82.6–102.9)
MICROALBUMIN/CREAT 24H UR: <12 MG/L
MICROALBUMIN/CREAT UR-RTO: NORMAL MCG/MG CREAT
MONOCYTES # BLD: 6 % (ref 3–12)
NRBC AUTOMATED: 0 PER 100 WBC
PDW BLD-RTO: 13.2 % (ref 11.8–14.4)
PLATELET # BLD: 235 K/UL (ref 138–453)
PMV BLD AUTO: 9.8 FL (ref 8.1–13.5)
POTASSIUM SERPL-SCNC: 4.3 MMOL/L (ref 3.7–5.3)
RBC # BLD: 5.93 M/UL (ref 4.21–5.77)
SEG NEUTROPHILS: 67 % (ref 36–65)
SEGMENTED NEUTROPHILS ABSOLUTE COUNT: 6.41 K/UL (ref 1.5–8.1)
SODIUM BLD-SCNC: 138 MMOL/L (ref 135–144)
URIC ACID: 5.9 MG/DL (ref 3.4–7)
WBC # BLD: 9.6 K/UL (ref 3.5–11.3)

## 2022-10-26 LAB — ERYTHROPOIETIN: 13 MU/ML (ref 4–27)

## 2022-11-07 RX ORDER — LISINOPRIL AND HYDROCHLOROTHIAZIDE 25; 20 MG/1; MG/1
TABLET ORAL
Qty: 90 TABLET | Refills: 2 | Status: SHIPPED | OUTPATIENT
Start: 2022-11-07

## 2022-11-07 NOTE — TELEPHONE ENCOUNTER
Last visit: 10/20/22  Last Med refill: 1/13/22      Next Visit Date:  Future Appointments   Date Time Provider Papo Bakeri   1/23/2023  7:00 AM Leidy Duff MD [facility]   Topic Date Due    Pneumococcal 65+ years Vaccine (1 - PCV) Never done    Diabetic retinal exam  Never done    Hepatitis C screen  Never done    COVID-19 Vaccine (3 - Booster for Bernard series) 01/13/2022    Flu vaccine (1) Never done    DTaP/Tdap/Td vaccine (1 - Tdap) 06/09/2023 (Originally 5/28/1974)    Shingles vaccine (1 of 2) 06/09/2023 (Originally 5/28/2004)    A1C test (Diabetic or Prediabetic)  01/20/2023    Diabetic foot exam  07/18/2023    Lipids  07/20/2023    Depression Screen  10/20/2023    Annual Wellness Visit (AWV)  10/21/2023    Diabetic microalbuminuria test  10/25/2023    Colorectal Cancer Screen  11/29/2024    Hepatitis A vaccine  Aged Out    Hib vaccine  Aged Out    Meningococcal (ACWY) vaccine  Aged Out       Hemoglobin A1C (%)   Date Value   10/20/2022 9.2   07/18/2022 8.4   03/23/2022 9.4             ( goal A1C is < 7)   Microalb/Crt.  Ratio (mcg/mg creat)   Date Value   10/25/2022 Can not be calculated     LDL Cholesterol (mg/dL)   Date Value   07/20/2022 20       (goal LDL is <100)   AST (U/L)   Date Value   11/01/2021 20     ALT (U/L)   Date Value   11/01/2021 31     BUN (mg/dL)   Date Value   10/25/2022 14     BP Readings from Last 3 Encounters:   10/20/22 122/68   07/18/22 132/64   06/09/22 116/70          (goal 120/80)    All Future Testing planned in [facility]  Lab Frequency Next Occurrence               Patient Active Problem List:     Chronic obstructive pulmonary disease ([facility]     Coronary atherosclerosis     Diabetes mellitus ([facility] [facility]     Dyslipidemia     Hypertension     Wheezing     Type 2 diabetes mellitus with hyperglycemia     Chronic diarrhea     History of acute anterolateral myocardial infarction     Sleep apnea     Hyperlipidemia associated with type 2 diabetes mellitus ([address]     Hyperlipidemia LDL goal <70     Olecranon bursitis of right elbow

## 2022-11-10 RX ORDER — MONTELUKAST SODIUM 4 MG/1
TABLET, CHEWABLE ORAL
Qty: 60 TABLET | Refills: 3 | Status: SHIPPED | OUTPATIENT
Start: 2022-11-10

## 2022-11-29 NOTE — TELEPHONE ENCOUNTER
Last visit: 10/20/22  Last Med refill: 10/4/22  Does patient have enough medication for 72 hours:   Next Visit Date:  Future Appointments   Date Time Provider Papo Fuentes   1/23/2023  7:00 AM Shiloh Cook MD [facility]   Topic Date Due    Pneumococcal 65+ years Vaccine (1 - PCV) Never done    Diabetic retinal exam  Never done    Hepatitis C screen  Never done    COVID-19 Vaccine (3 - Booster for Bernard series) 01/13/2022    Flu vaccine (1) Never done    DTaP/Tdap/Td vaccine (1 - Tdap) 06/09/2023 (Originally 5/28/1974)    Shingles vaccine (1 of 2) 06/09/2023 (Originally 5/28/2004)    A1C test (Diabetic or Prediabetic)  01/20/2023    Diabetic foot exam  07/18/2023    Lipids  07/20/2023    Depression Screen  10/20/2023    Annual Wellness Visit (AWV)  10/21/2023    Diabetic microalbuminuria test  10/25/2023    Colorectal Cancer Screen  11/29/2024    Hepatitis A vaccine  Aged Out    Hib vaccine  Aged Out    Meningococcal (ACWY) vaccine  Aged Out       Hemoglobin A1C (%)   Date Value   10/20/2022 9.2   07/18/2022 8.4   03/23/2022 9.4             ( goal A1C is < 7)   Microalb/Crt.  Ratio (mcg/mg creat)   Date Value   10/25/2022 Can not be calculated     LDL Cholesterol (mg/dL)   Date Value   07/20/2022 20       (goal LDL is <100)   AST (U/L)   Date Value   11/01/2021 20     ALT (U/L)   Date Value   11/01/2021 31     BUN (mg/dL)   Date Value   10/25/2022 14     BP Readings from Last 3 Encounters:   10/20/22 122/68   07/18/22 132/64   06/09/22 116/70          (goal 120/80)    All Future Testing planned in [facility]  Lab Frequency Next Occurrence               Patient Active Problem List:     Chronic obstructive pulmonary disease ([facility]     Coronary atherosclerosis     Diabetes mellitus ([facility]     Dyslipidemia     Hypertension     Wheezing     Type 2 diabetes mellitus with hyperglycemia     Chronic diarrhea     History of acute anterolateral myocardial infarction     Sleep apnea Hyperlipidemia associated with type 2 diabetes mellitus (HCC)     Hyperlipidemia LDL goal <70     Olecranon bursitis of right elbow

## 2022-11-30 ENCOUNTER — TELEPHONE (OUTPATIENT)
Dept: PRIMARY CARE CLINIC | Age: 67
End: 2022-11-30

## 2022-11-30 NOTE — TELEPHONE ENCOUNTER
Received a refill req from pharmacy for glimepiride, called patient to see if he had started to wean down yet on that since starting the trulicity.  Requested patient call back to let us know so we can send over correct amount with refill request.

## 2022-12-01 RX ORDER — GLIMEPIRIDE 2 MG/1
2 TABLET ORAL 2 TIMES DAILY
Qty: 60 TABLET | Refills: 1 | Status: CANCELLED | OUTPATIENT
Start: 2022-12-01 | End: 2022-12-31

## 2022-12-01 RX ORDER — GLIMEPIRIDE 2 MG/1
TABLET ORAL
Qty: 180 TABLET | Refills: 0 | OUTPATIENT
Start: 2022-12-01

## 2022-12-01 NOTE — TELEPHONE ENCOUNTER
Spoke with patient, fasting blood sugars are around . Tolerating trulicity well and is 3 weeks in. Patient is going to go down to 2 amaryl per day instead of 4, then we will likely be able to take him off amaryl completely in 4 weeks. Patient verbalized understanding and will touch base in about a month with a report of his sugars. Advised patient to watch for lows, as that is a concern with the trulicity, metformin, and amaryl combo.

## 2022-12-02 NOTE — TELEPHONE ENCOUNTER
Last visit: 10/20/22  Last Med refill: 10/4/22  Does patient have enough medication for 72   Future Appointments   Date Time Provider Papo Gina   1/23/2023  7:00 AM Adiel Munoz MD [facility]   Topic Date Due    Pneumococcal 65+ years Vaccine (1 - PCV) Never done    Diabetic retinal exam  Never done    Hepatitis C screen  Never done    COVID-19 Vaccine (3 - Booster for Bernard series) 01/13/2022    Flu vaccine (1) Never done    DTaP/Tdap/Td vaccine (1 - Tdap) 06/09/2023 (Originally 5/28/1974)    Shingles vaccine (1 of 2) 06/09/2023 (Originally 5/28/2004)    A1C test (Diabetic or Prediabetic)  01/20/2023    Diabetic foot exam  07/18/2023    Lipids  07/20/2023    Depression Screen  10/20/2023    Annual Wellness Visit (AWV)  10/21/2023    Diabetic microalbuminuria test  10/25/2023    Colorectal Cancer Screen  11/29/2024    Hepatitis A vaccine  Aged Out    Hib vaccine  Aged Out    Meningococcal (ACWY) vaccine  Aged Out       Hemoglobin A1C (%)   Date Value   10/20/2022 9.2   07/18/2022 8.4   03/23/2022 9.4             ( goal A1C is < 7)   Microalb/Crt.  Ratio (mcg/mg creat)   Date Value   10/25/2022 Can not be calculated     LDL Cholesterol (mg/dL)   Date Value   07/20/2022 20       (goal LDL is <100)   AST (U/L)   Date Value   11/01/2021 20     ALT (U/L)   Date Value   11/01/2021 31     BUN (mg/dL)   Date Value   10/25/2022 14     BP Readings from Last 3 Encounters:   10/20/22 122/68   07/18/22 132/64   06/09/22 116/70          (goal 120/80)    All Future Testing planned in [facility]  Lab Frequency Next Occurrence               Patient Active Problem List:     Chronic obstructive pulmonary disease ([facility] [address]     Coronary atherosclerosis     Diabetes mellitus ([facility]     Dyslipidemia     Hypertension     Wheezing     Type 2 diabetes mellitus with hyperglycemia     Chronic diarrhea     History of acute anterolateral myocardial infarction     Sleep apnea     Hyperlipidemia associated with type 2 diabetes mellitus (HCC)     Hyperlipidemia LDL goal <70     Olecranon bursitis of right elbow

## 2022-12-05 RX ORDER — GLIMEPIRIDE 2 MG/1
TABLET ORAL
Qty: 60 TABLET | Refills: 0 | OUTPATIENT
Start: 2022-12-05

## 2023-01-05 ENCOUNTER — OFFICE VISIT (OUTPATIENT)
Dept: PRIMARY CARE CLINIC | Age: 68
End: 2023-01-05
Payer: MEDICARE

## 2023-01-05 VITALS
SYSTOLIC BLOOD PRESSURE: 116 MMHG | BODY MASS INDEX: 31.13 KG/M2 | RESPIRATION RATE: 16 BRPM | OXYGEN SATURATION: 95 % | DIASTOLIC BLOOD PRESSURE: 74 MMHG | HEART RATE: 70 BPM | WEIGHT: 205.4 LBS | HEIGHT: 68 IN

## 2023-01-05 DIAGNOSIS — M79.674 GREAT TOE PAIN, RIGHT: Primary | ICD-10-CM

## 2023-01-05 DIAGNOSIS — M10.9 ACUTE GOUT INVOLVING TOE OF RIGHT FOOT, UNSPECIFIED CAUSE: ICD-10-CM

## 2023-01-05 PROCEDURE — 99213 OFFICE O/P EST LOW 20 MIN: CPT | Performed by: PHYSICIAN ASSISTANT

## 2023-01-05 PROCEDURE — 1123F ACP DISCUSS/DSCN MKR DOCD: CPT | Performed by: PHYSICIAN ASSISTANT

## 2023-01-05 PROCEDURE — 3017F COLORECTAL CA SCREEN DOC REV: CPT | Performed by: PHYSICIAN ASSISTANT

## 2023-01-05 PROCEDURE — G8484 FLU IMMUNIZE NO ADMIN: HCPCS | Performed by: PHYSICIAN ASSISTANT

## 2023-01-05 PROCEDURE — 3078F DIAST BP <80 MM HG: CPT | Performed by: PHYSICIAN ASSISTANT

## 2023-01-05 PROCEDURE — G8417 CALC BMI ABV UP PARAM F/U: HCPCS | Performed by: PHYSICIAN ASSISTANT

## 2023-01-05 PROCEDURE — 3074F SYST BP LT 130 MM HG: CPT | Performed by: PHYSICIAN ASSISTANT

## 2023-01-05 PROCEDURE — G8427 DOCREV CUR MEDS BY ELIG CLIN: HCPCS | Performed by: PHYSICIAN ASSISTANT

## 2023-01-05 PROCEDURE — 1036F TOBACCO NON-USER: CPT | Performed by: PHYSICIAN ASSISTANT

## 2023-01-05 RX ORDER — TRAMADOL HYDROCHLORIDE 50 MG/1
50 TABLET ORAL EVERY 8 HOURS PRN
Qty: 9 TABLET | Refills: 0 | Status: SHIPPED | OUTPATIENT
Start: 2023-01-05 | End: 2023-01-08

## 2023-01-05 RX ORDER — METHYLPREDNISOLONE 4 MG/1
TABLET ORAL
Qty: 1 KIT | Refills: 0 | Status: SHIPPED | OUTPATIENT
Start: 2023-01-05 | End: 2023-01-11

## 2023-01-05 ASSESSMENT — PATIENT HEALTH QUESTIONNAIRE - PHQ9
SUM OF ALL RESPONSES TO PHQ QUESTIONS 1-9: 0
SUM OF ALL RESPONSES TO PHQ QUESTIONS 1-9: 0
1. LITTLE INTEREST OR PLEASURE IN DOING THINGS: 0
SUM OF ALL RESPONSES TO PHQ9 QUESTIONS 1 & 2: 0
2. FEELING DOWN, DEPRESSED OR HOPELESS: 0
SUM OF ALL RESPONSES TO PHQ QUESTIONS 1-9: 0
SUM OF ALL RESPONSES TO PHQ QUESTIONS 1-9: 0

## 2023-01-05 ASSESSMENT — ENCOUNTER SYMPTOMS
RHINORRHEA: 0
BACK PAIN: 0
COUGH: 0
SHORTNESS OF BREATH: 0
ABDOMINAL PAIN: 0
DIARRHEA: 0
CONSTIPATION: 0
SINUS PAIN: 0
NAUSEA: 0
EYE PAIN: 0
VOMITING: 0

## 2023-01-05 NOTE — PROGRESS NOTES
Riya Elizabeth [address] [place] [address] [id number] [phone]  Dept: [phone]  Dept Fax: [phone]    Roula Madsen is a 75 y.o. male who presents today for his medical conditions/complaints as noted below. Chief Complaint   Patient presents with    Foot Pain     Right foot x 4 days, has used OTC medications for pain with no relief       HPI:     Patient presents to the office for evaluation of acute right foot/toe pain. He states pain started 4 days ago. Pain is in the right great toe. Pain does not radiate. He admits to redness and swelling of the toe. He has difficulty putting his shoe on due to pain. He has difficulty with pushing off and walking due to pain of the foot. He has history of similar outbreaks in the past.  He has been told he has gout. He has tried using indomethacin which provides no benefit. No other concerns or complaints. No fevers or chills. No chest pain or shortness of breath. BP stable. We had a long discussion about his diabetic management. I advised to closely monitor sugars. He is to reduce carbohydrate intake. Hemoglobin A1C (%)   Date Value   10/20/2022 9.2   07/18/2022 8.4   03/23/2022 9.4             ( goal A1C is < 7)   Microalb/Crt.  Ratio (mcg/mg creat)   Date Value   10/25/2022 Can not be calculated     LDL Cholesterol (mg/dL)   Date Value   07/20/2022 20       (goal LDL is <100)   AST (U/L)   Date Value   11/01/2021 20     ALT (U/L)   Date Value   11/01/2021 31     BUN (mg/dL)   Date Value   10/25/2022 14     BP Readings from Last 3 Encounters:   01/05/23 116/74   10/20/22 122/68   07/18/22 132/64          (goal 120/80)    Past Medical History:   Diagnosis Date    COPD (chronic obstructive pulmonary disease) ([facility] [address]     Diabetes mellitus ([facility] [address]     Hypertension       Past Surgical History:   Procedure Laterality Date    COLONOSCOPY  11/29/2021    COLONOSCOPY DIAGNOSTIC    COLONOSCOPY N/A 11/29/2021 COLONOSCOPY SIGMOID COLON POLYPECTOMIES SNARE/RANDOM COLON BIOPSIES performed by Eric Park MD at [facility] History   Family history unknown: Yes       Social History     Tobacco Use    Smoking status: Never    Smokeless tobacco: Never   Substance Use Topics    Alcohol use: Yes     Alcohol/week: 1.0 standard drink     Types: 1 Cans of beer per week     Comment: occasionally      Current Outpatient Medications   Medication Sig Dispense Refill    methylPREDNISolone (MEDROL DOSEPACK) 4 MG tablet Take by mouth. 1 kit 0    traMADol (ULTRAM) 50 MG tablet Take 1 tablet by mouth every 8 hours as needed for Pain for up to 3 days. Intended supply: 5 days.  Take lowest dose possible to manage pain Max Daily Amount: 150 mg 9 tablet 0    colestipol (COLESTID) 1 g tablet TAKE ONE TABLET BY MOUTH DAILY 60 tablet 3    Dulaglutide 0.75 MG/0.5ML SOPN Inject 0.75 mg into the skin once a week 4 Adjustable Dose Pre-filled Pen Syringe 5    lisinopril-hydroCHLOROthiazide (PRINZIDE;ZESTORETIC) 20-25 MG per tablet Take 1 tablet by mouth once daily 90 tablet 2    dapagliflozin (FARXIGA) 10 MG tablet Take 1 tablet by mouth every morning 90 tablet 1    metFORMIN (GLUCOPHAGE-XR) 500 MG extended release tablet TAKE 2 TABLETS BY MOUTH WITH BREAKFAST AND 2 WITH SUPPER 360 tablet 0    albuterol sulfate HFA (PROVENTIL HFA) 108 (90 Base) MCG/ACT inhaler Inhale 2 puffs into the lungs every 6 hours as needed for Wheezing 18 g 3    albuterol (PROVENTIL) (2.5 MG/3ML) 0.083% nebulizer solution Take 3 mLs by nebulization every 6 hours as needed for Wheezing or Shortness of Breath 120 each 0    simvastatin (ZOCOR) 40 MG tablet Take 40 mg by mouth nightly      lisinopril (PRINIVIL;ZESTRIL) 10 MG tablet Take 1 tablet by mouth daily 90 tablet 3    amLODIPine (NORVASC) 10 MG tablet Take 10 mg by mouth daily      carvedilol (COREG) 6.25 MG tablet Take 6.25 mg by mouth 2 times daily      clopidogrel (PLAVIX) 75 MG tablet Take 75 mg by mouth daily      glimepiride (AMARYL) 2 MG tablet TAKE 2 TABLETS BY MOUTH IN THE MORNING AND 2 NIGHTLY (Patient not taking: Reported on 1/5/2023) 180 tablet 0     No current facility-administered medications for this visit. No Known Allergies    Health Maintenance   Topic Date Due    Pneumococcal 65+ years Vaccine (1 - PCV) Never done    Diabetic Alb to Cr ratio (uACR) test  Never done    Diabetic retinal exam  Never done    Hepatitis C screen  Never done    COVID-19 Vaccine (3 - Booster for Bernard series) 01/13/2022    A1C test (Diabetic or Prediabetic)  01/20/2023    DTaP/Tdap/Td vaccine (1 - Tdap) 06/09/2023 (Originally [birth year]28/1974)    Shingles vaccine (1 of 2) 06/09/2023 (Originally 5/28/2004)    Flu vaccine (1) 01/05/2024 (Originally 8/1/2022)    Diabetic foot exam  07/18/2023    Lipids  07/20/2023    Annual Wellness Visit (AWV)  10/21/2023    GFR test (Diabetes, CKD 3-4, OR last GFR 15-59)  10/25/2023    Depression Screen  01/05/2024    Colorectal Cancer Screen  11/29/2024    Hepatitis A vaccine  Aged Out    Hib vaccine  Aged Out    Meningococcal (ACWY) vaccine  Aged Out       Subjective:      Review of Systems   Constitutional:  Negative for chills and fatigue. HENT:  Negative for congestion, rhinorrhea and sinus pain. Eyes:  Negative for pain. Respiratory:  Negative for cough and shortness of breath. Cardiovascular:  Negative for chest pain and leg swelling. Gastrointestinal:  Negative for abdominal pain, constipation, diarrhea, nausea and vomiting. Genitourinary:  Negative for difficulty urinating, enuresis and testicular pain. Musculoskeletal:  Positive for arthralgias, gait problem and joint swelling. Negative for back pain and myalgias. Skin:  Negative for rash. Neurological:  Negative for dizziness and headaches. Psychiatric/Behavioral:  Negative for confusion and sleep disturbance. The patient is not nervous/anxious. All other systems reviewed and are negative.     Objective: Physical Exam  Vitals and nursing note reviewed. Constitutional:       General: He is not in acute distress. Appearance: Normal appearance. He is obese. HENT:      Head: Normocephalic. Mouth/Throat:      Mouth: Mucous membranes are moist.   Eyes:      Extraocular Movements: Extraocular movements intact. Conjunctiva/sclera: Conjunctivae normal.      Pupils: Pupils are equal, round, and reactive to light. Cardiovascular:      Rate and Rhythm: Normal rate and regular rhythm. Pulses: Normal pulses. Heart sounds: Normal heart sounds. Pulmonary:      Effort: Pulmonary effort is normal.      Breath sounds: Normal breath sounds. Musculoskeletal:      Cervical back: Normal range of motion. Right lower leg: No edema. Left lower leg: No edema. Right foot: Decreased range of motion. Left foot: Normal range of motion. Feet:      Right foot:      Skin integrity: Erythema present. Left foot:      Skin integrity: No erythema. Comments: Right great toe reveals decreased range of motion, significant tenderness, redness, swelling. Remainder of foot within normal limits. No Achilles tenderness, ankle tenderness, plantar fascial tenderness. Left foot appears within normal limits. No swelling or erythema. Lymphadenopathy:      Cervical: No cervical adenopathy. Skin:     General: Skin is warm. Capillary Refill: Capillary refill takes less than 2 seconds. Neurological:      General: No focal deficit present. Mental Status: He is alert and oriented to person, place, and time. Psychiatric:         Mood and Affect: Mood normal.         Behavior: Behavior normal.     /74 (Site: Left Upper Arm, Position: Sitting, Cuff Size: Medium Adult)   Pulse 70   Resp 16   Ht 5' 8\" (1.727 m)   Wt 205 lb 6.4 oz (93.2 kg)   SpO2 95%   BMI 31.23 kg/m²     Assessment:       ICD-10-CM    1.  Great toe pain, right  M79.674 methylPREDNISolone (MEDROL DOSEPACK) 4 MG tablet     traMADol (ULTRAM) 50 MG tablet      2. Acute gout involving toe of right foot, unspecified cause  M10.9 methylPREDNISolone (MEDROL DOSEPACK) 4 MG tablet     traMADol (ULTRAM) 50 MG tablet               Plan:      1,2. Patient with acute pain of right great toe with suspected underlying gout. He has failed NSAIDs/indomethacin. Plan for Medrol Dosepak and tramadol for pain. Discussed instructions side effects. He is instructed to closely monitor sugars and any excessive carbohydrates. Patient strongly advised to follow with Dr. Aleah Garcia for diabetic management. He is agreeable to plan. He is to call the office with any concerns or changes. Return if symptoms worsen or fail to improve. No orders of the defined types were placed in this encounter. Patient given educational materials - see patient instructions. Discussed use, benefit, and side effects of prescribed medications. All patient questions answered. Pt voiced understanding. Reviewed health maintenance. Instructed to continue current medications, diet and exercise. Patient agreed with treatment plan. Follow up as directed.         Electronically signed by Cori Pagan PA-C on 1/5/2023 at 10:20 AM

## 2023-01-12 ENCOUNTER — PATIENT MESSAGE (OUTPATIENT)
Dept: PRIMARY CARE CLINIC | Age: 68
End: 2023-01-12

## 2023-01-12 NOTE — TELEPHONE ENCOUNTER
From: Darlyn Champagne  To: Dr. Remy Farm: 1/12/2023 9:09 AM EST  Subject: Blood Sugar    when i was put on Truelicity my blood sugar went down to . When i tried to rewnew script for Germaine Fernandez was told you wanted it cut in half for 30 days. was told you didn't want blood sugar to drop to low. [facility] said never got script approval. My blood sugar now running 180-200. I just finished 6 days of methylprednisolone. I'm losing weight but blood sugar is still way high.

## 2023-01-23 ENCOUNTER — OFFICE VISIT (OUTPATIENT)
Dept: PRIMARY CARE CLINIC | Age: 68
End: 2023-01-23
Payer: MEDICARE

## 2023-01-23 VITALS
WEIGHT: 206.2 LBS | HEART RATE: 54 BPM | DIASTOLIC BLOOD PRESSURE: 82 MMHG | HEIGHT: 68 IN | SYSTOLIC BLOOD PRESSURE: 132 MMHG | BODY MASS INDEX: 31.25 KG/M2 | OXYGEN SATURATION: 92 %

## 2023-01-23 DIAGNOSIS — D58.2 ELEVATED HEMOGLOBIN (HCC): ICD-10-CM

## 2023-01-23 DIAGNOSIS — B35.3 TINEA PEDIS OF RIGHT FOOT: ICD-10-CM

## 2023-01-23 DIAGNOSIS — M10.9 ACUTE GOUT INVOLVING TOE OF RIGHT FOOT, UNSPECIFIED CAUSE: ICD-10-CM

## 2023-01-23 DIAGNOSIS — I25.10 ATHEROSCLEROSIS OF NATIVE CORONARY ARTERY OF NATIVE HEART WITHOUT ANGINA PECTORIS: ICD-10-CM

## 2023-01-23 DIAGNOSIS — E11.65 TYPE 2 DIABETES MELLITUS WITH HYPERGLYCEMIA, WITHOUT LONG-TERM CURRENT USE OF INSULIN (HCC): Primary | ICD-10-CM

## 2023-01-23 LAB — HBA1C MFR BLD: 7.9 %

## 2023-01-23 PROCEDURE — 3079F DIAST BP 80-89 MM HG: CPT | Performed by: FAMILY MEDICINE

## 2023-01-23 PROCEDURE — 3075F SYST BP GE 130 - 139MM HG: CPT | Performed by: FAMILY MEDICINE

## 2023-01-23 PROCEDURE — 3017F COLORECTAL CA SCREEN DOC REV: CPT | Performed by: FAMILY MEDICINE

## 2023-01-23 PROCEDURE — 1123F ACP DISCUSS/DSCN MKR DOCD: CPT | Performed by: FAMILY MEDICINE

## 2023-01-23 PROCEDURE — 2022F DILAT RTA XM EVC RTNOPTHY: CPT | Performed by: FAMILY MEDICINE

## 2023-01-23 PROCEDURE — G8427 DOCREV CUR MEDS BY ELIG CLIN: HCPCS | Performed by: FAMILY MEDICINE

## 2023-01-23 PROCEDURE — G8484 FLU IMMUNIZE NO ADMIN: HCPCS | Performed by: FAMILY MEDICINE

## 2023-01-23 PROCEDURE — 1036F TOBACCO NON-USER: CPT | Performed by: FAMILY MEDICINE

## 2023-01-23 PROCEDURE — 99214 OFFICE O/P EST MOD 30 MIN: CPT | Performed by: FAMILY MEDICINE

## 2023-01-23 PROCEDURE — 83036 HEMOGLOBIN GLYCOSYLATED A1C: CPT | Performed by: FAMILY MEDICINE

## 2023-01-23 PROCEDURE — 3051F HG A1C>EQUAL 7.0%<8.0%: CPT | Performed by: FAMILY MEDICINE

## 2023-01-23 PROCEDURE — G8417 CALC BMI ABV UP PARAM F/U: HCPCS | Performed by: FAMILY MEDICINE

## 2023-01-23 ASSESSMENT — ENCOUNTER SYMPTOMS
BACK PAIN: 0
CHEST TIGHTNESS: 0
COUGH: 0
CONSTIPATION: 0
DIARRHEA: 0
SHORTNESS OF BREATH: 0
SORE THROAT: 0

## 2023-01-23 ASSESSMENT — PATIENT HEALTH QUESTIONNAIRE - PHQ9
SUM OF ALL RESPONSES TO PHQ QUESTIONS 1-9: 0
SUM OF ALL RESPONSES TO PHQ QUESTIONS 1-9: 0
2. FEELING DOWN, DEPRESSED OR HOPELESS: 0
SUM OF ALL RESPONSES TO PHQ QUESTIONS 1-9: 0
1. LITTLE INTEREST OR PLEASURE IN DOING THINGS: 0
SUM OF ALL RESPONSES TO PHQ QUESTIONS 1-9: 0
SUM OF ALL RESPONSES TO PHQ9 QUESTIONS 1 & 2: 0

## 2023-01-23 NOTE — PATIENT INSTRUCTIONS
CONSTIPATION TIPS  Increase fluids with water and juices  Flaxseed ground 2 tbsp daily  Metamucil wafers, Fiber bars, etc.  Increase fiber in diet  25-30 gms daily  May use milk of magnesia as directed in induce BM  Plan on BM every 36 hours at least  Remember signs of bowel obstruction!        GOUT  Avoid alcohol  animal meats limit caffeine   increase water consumption    Tinea Pedis  Soak feet and warm soapy water at least once a day rinse dry applied Tinactin or Lamisil between toes to kill toenail fungus and in between the toes as well then cover with coconut oil or some other moisturizer      Increase Trulicity to 1.5 mg weekly      Add daily Baby ASA with food to decrease risk associated with Increased Hemoglobin

## 2023-01-23 NOTE — PROGRESS NOTES
Subjective:     Patient ID: Chata Cai is a 75 y.o. male    HPI  Reuben Bryson returns today for recheck on his diabetes his coronary artery disease and his main complaint today is gout in the great toe of his right foot. He saw Alexander Carmona in the office a couple weeks ago and was treated with steroids with very little improvement. His last uric acid level was 5.9. He has not been treated with allopurinol but this might be a good idea with his coronary disease as well. In addition he has a history of elevated hemoglobin. We talked about hyperviscosity. Probably secondary to his history of smoking. Recommend he take a baby aspirin daily as long as he has had no bleeding  Main issue is his diabetes today. His A1c is down today to 7.9 from 9.2. Currently taking Trulicity Farxiga and metformin and is off the glimepiride. Feels much better and that he is down about 20 pounds over the last year or so. However the Trulicity and [place] are expensive with his insurance. We talked about overall risk reduction  We have also talked about Medicare issues and the price of medications and Medicare part D. We asked him to call his insurance company in regards to coverage of these meds that are working well for him. We will also have [place] [facility] try to provide some resources. In the meantime we will give him some samples of Trulicity 1.5 mg until administratively we can provide some solution hopefully. Denies alcohol use does use caffeine. He is retired and enjoys his grand kids      Review of Systems   Constitutional:  Negative for activity change, appetite change, fatigue and fever. HENT:  Negative for sore throat. Eyes:  Negative for visual disturbance. Respiratory:  Negative for cough, chest tightness and shortness of breath. Cardiovascular:  Negative for chest pain, palpitations and leg swelling. Gastrointestinal:  Negative for constipation and diarrhea.    Genitourinary:  Negative for dysuria and urgency. Musculoskeletal:  Positive for arthralgias. Negative for back pain. Neurological:  Negative for dizziness, syncope and headaches. Hematological:  Does not bruise/bleed easily. Psychiatric/Behavioral:  Negative for confusion and sleep disturbance. The patient is not nervous/anxious. Objective:     Physical Exam  Constitutional:       General: He is not in acute distress. Appearance: Normal appearance. He is obese. He is not ill-appearing. HENT:      Head: Normocephalic. Right Ear: External ear normal.      Left Ear: External ear normal.      Nose: Nose normal.      Mouth/Throat:      Mouth: Mucous membranes are moist.      Pharynx: Oropharynx is clear. Eyes:      Conjunctiva/sclera: Conjunctivae normal.   Cardiovascular:      Rate and Rhythm: Normal rate and regular rhythm. Pulses: Normal pulses. Heart sounds: Normal heart sounds. No murmur heard. Pulmonary:      Effort: Pulmonary effort is normal.      Breath sounds: Normal breath sounds. Musculoskeletal:         General: Normal range of motion. Cervical back: Neck supple. Right lower leg: No edema. Left lower leg: No edema. Comments: Does have swelling and deformity of the great end TP joint right foot. Tender to touch no redness or warmth. May be partially treated gout   Lymphadenopathy:      Cervical: No cervical adenopathy. Skin:     General: Skin is warm and dry. Capillary Refill: Capillary refill takes less than 2 seconds. Comments: Noted tinea pedis extensively right foot between toes mycotic nails   Neurological:      General: No focal deficit present. Mental Status: He is alert and oriented to person, place, and time. Psychiatric:         Mood and Affect: Mood normal.         Behavior: Behavior normal.       Assessment/Plan:     1. Type 2 diabetes mellitus with hyperglycemia, without long-term current use of insulin ([facility] [address]    2.  Acute gout involving toe of right foot, unspecified cause    3. Elevated hemoglobin (HCC)    4. Atherosclerosis of native coronary artery of native heart without angina pectoris    5. Tinea pedis of right foot          Royce Hancock was seen today for diabetes, hypertension, cholesterol problem and foot pain. Diagnoses and all orders for this visit:    Type 2 diabetes mellitus with hyperglycemia, without long-term current use of insulin (HCC)  -     POCT glycosylated hemoglobin (Hb M3E)  Provide Trulicity samples keep the rest the same please call insurance company to see what can be done to help  Acute gout involving toe of right foot, unspecified cause  Start Daypro as per patient instructions  Elevated hemoglobin ([facility] [address]  Add baby aspirin daily with food  Atherosclerosis of native coronary artery of native heart without angina pectoris  No chest pain follows with cardiology. May consider adding low-dose allopurinol every other day  Tinea pedis of right foot  Recommend Tinactin or Lamisil. Foot soaks see patient instructions  Other orders  -     oxaprozin (DAYPRO) 600 MG tablet; Take 1 tablet by mouth daily Take 1 pill with meals 3 times a day for 2 days then take 1 pill/day for 10 days        Vianney Garcia MD    Please note that this chart was generated using voice recognition Dragon dictation software. Although every effort was made to ensure the accuracy of this automated transcription, some errors in transcription may have occurred.

## 2023-02-06 ENCOUNTER — PATIENT MESSAGE (OUTPATIENT)
Dept: PRIMARY CARE CLINIC | Age: 68
End: 2023-02-06

## 2023-02-06 DIAGNOSIS — E11.65 TYPE 2 DIABETES MELLITUS WITH HYPERGLYCEMIA, WITHOUT LONG-TERM CURRENT USE OF INSULIN (HCC): Primary | ICD-10-CM

## 2023-02-06 NOTE — TELEPHONE ENCOUNTER
From: Tyler Vargas  To: Dr. Liudmila Carranza: 2/6/2023 11:33 AM EST  Subject: Truelicity     Talked to [facility] and there are still 2 refills on Truelicity 8.44. Could you call in higher dose so it will be ready next time it's available.

## 2023-02-26 DIAGNOSIS — E11.65 TYPE 2 DIABETES MELLITUS WITH HYPERGLYCEMIA, WITHOUT LONG-TERM CURRENT USE OF INSULIN (HCC): Primary | ICD-10-CM

## 2023-02-28 RX ORDER — METFORMIN HYDROCHLORIDE 500 MG/1
TABLET, EXTENDED RELEASE ORAL
Qty: 360 TABLET | Refills: 3 | Status: SHIPPED | OUTPATIENT
Start: 2023-02-28

## 2023-03-10 DIAGNOSIS — E11.65 TYPE 2 DIABETES MELLITUS WITH HYPERGLYCEMIA, WITHOUT LONG-TERM CURRENT USE OF INSULIN (HCC): ICD-10-CM

## 2023-03-10 NOTE — TELEPHONE ENCOUNTER
Last visit:   Last Med refill:   Does patient have enough medication for 72 hours: Next Visit Date:  Future Appointments   Date Time Provider Papo Fuentes   4/26/2023  7:00 AM Luly Markham MD [place] Maintenance   Topic Date Due    Pneumococcal 65+ years Vaccine (1 - PCV) Never done    Diabetic retinal exam  Never done    Hepatitis C screen  Never done    COVID-19 Vaccine (3 - Booster for Bernard series) 01/13/2022    DTaP/Tdap/Td vaccine (1 - Tdap) 06/09/2023 (Originally [birth year]28/1974)    Shingles vaccine (1 of 2) 06/09/2023 (Originally 5/28/2004)    Flu vaccine (1) 01/05/2024 (Originally 8/1/2022)    Diabetic foot exam  07/18/2023    Lipids  07/20/2023    Annual Wellness Visit (AWV)  10/21/2023    Diabetic Alb to Cr ratio (uACR) test  10/25/2023    GFR test (Diabetes, CKD 3-4, OR last GFR 15-59)  10/25/2023    A1C test (Diabetic or Prediabetic)  01/23/2024    Depression Screen  01/23/2024    Colorectal Cancer Screen  11/29/2024    Hepatitis A vaccine  Aged Out    Hib vaccine  Aged Out    Meningococcal (ACWY) vaccine  Aged Out       Hemoglobin A1C (%)   Date Value   01/23/2023 7.9   10/20/2022 9.2   07/18/2022 8.4             ( goal A1C is < 7)   Microalb/Crt.  Ratio (mcg/mg creat)   Date Value   10/25/2022 Can not be calculated     LDL Cholesterol (mg/dL)   Date Value   07/20/2022 20       (goal LDL is <100)   AST (U/L)   Date Value   11/01/2021 20     ALT (U/L)   Date Value   11/01/2021 31     BUN (mg/dL)   Date Value   10/25/2022 14     BP Readings from Last 3 Encounters:   01/23/23 132/82   01/05/23 116/74   10/20/22 122/68          (goal 120/80)    All Future Testing planned in [facility]  Lab Frequency Next Occurrence               Patient Active Problem List:     Chronic obstructive pulmonary disease ([facility] [address]     Coronary atherosclerosis     Diabetes mellitus ([facility] [address]     Dyslipidemia     Hypertension     Wheezing     Type 2 diabetes mellitus with hyperglycemia     Chronic diarrhea History of acute anterolateral myocardial infarction     Sleep apnea     Hyperlipidemia associated with type 2 diabetes mellitus ([address]     Hyperlipidemia LDL goal <70     Olecranon bursitis of right elbow

## 2023-03-14 RX ORDER — DULAGLUTIDE 1.5 MG/.5ML
1.5 INJECTION, SOLUTION SUBCUTANEOUS WEEKLY
Qty: 6 ML | Refills: 3 | Status: SHIPPED | OUTPATIENT
Start: 2023-03-14 | End: 2023-04-13

## 2023-04-26 ENCOUNTER — OFFICE VISIT (OUTPATIENT)
Dept: PRIMARY CARE CLINIC | Age: 68
End: 2023-04-26

## 2023-04-26 VITALS
HEIGHT: 68 IN | OXYGEN SATURATION: 96 % | WEIGHT: 197.4 LBS | HEART RATE: 70 BPM | BODY MASS INDEX: 29.92 KG/M2 | SYSTOLIC BLOOD PRESSURE: 124 MMHG | DIASTOLIC BLOOD PRESSURE: 72 MMHG

## 2023-04-26 DIAGNOSIS — D58.2 ELEVATED HEMOGLOBIN (HCC): ICD-10-CM

## 2023-04-26 DIAGNOSIS — E11.65 TYPE 2 DIABETES MELLITUS WITH HYPERGLYCEMIA, WITHOUT LONG-TERM CURRENT USE OF INSULIN (HCC): Primary | ICD-10-CM

## 2023-04-26 DIAGNOSIS — I10 ESSENTIAL HYPERTENSION: ICD-10-CM

## 2023-04-26 DIAGNOSIS — E78.5 HYPERLIPIDEMIA ASSOCIATED WITH TYPE 2 DIABETES MELLITUS (HCC): ICD-10-CM

## 2023-04-26 DIAGNOSIS — G47.9 SLEEP DISTURBANCE: ICD-10-CM

## 2023-04-26 DIAGNOSIS — I25.10 ATHEROSCLEROSIS OF NATIVE CORONARY ARTERY OF NATIVE HEART WITHOUT ANGINA PECTORIS: ICD-10-CM

## 2023-04-26 DIAGNOSIS — E11.69 HYPERLIPIDEMIA ASSOCIATED WITH TYPE 2 DIABETES MELLITUS (HCC): ICD-10-CM

## 2023-04-26 LAB — HBA1C MFR BLD: 7.5 %

## 2023-04-26 RX ORDER — ATORVASTATIN CALCIUM 20 MG/1
20 TABLET, FILM COATED ORAL EVERY MORNING
COMMUNITY
Start: 2023-04-05

## 2023-04-26 RX ORDER — CLOPIDOGREL BISULFATE 75 MG/1
75 TABLET ORAL DAILY
COMMUNITY

## 2023-04-26 SDOH — ECONOMIC STABILITY: FOOD INSECURITY: WITHIN THE PAST 12 MONTHS, THE FOOD YOU BOUGHT JUST DIDN'T LAST AND YOU DIDN'T HAVE MONEY TO GET MORE.: NEVER TRUE

## 2023-04-26 SDOH — ECONOMIC STABILITY: INCOME INSECURITY: HOW HARD IS IT FOR YOU TO PAY FOR THE VERY BASICS LIKE FOOD, HOUSING, MEDICAL CARE, AND HEATING?: NOT VERY HARD

## 2023-04-26 SDOH — ECONOMIC STABILITY: HOUSING INSECURITY
IN THE LAST 12 MONTHS, WAS THERE A TIME WHEN YOU DID NOT HAVE A STEADY PLACE TO SLEEP OR SLEPT IN A SHELTER (INCLUDING NOW)?: NO

## 2023-04-26 SDOH — ECONOMIC STABILITY: FOOD INSECURITY: WITHIN THE PAST 12 MONTHS, YOU WORRIED THAT YOUR FOOD WOULD RUN OUT BEFORE YOU GOT MONEY TO BUY MORE.: NEVER TRUE

## 2023-04-26 ASSESSMENT — ENCOUNTER SYMPTOMS
SHORTNESS OF BREATH: 0
BACK PAIN: 0
DIARRHEA: 0
CONSTIPATION: 0
SORE THROAT: 0
CHEST TIGHTNESS: 0
COUGH: 0

## 2023-04-26 ASSESSMENT — PATIENT HEALTH QUESTIONNAIRE - PHQ9
SUM OF ALL RESPONSES TO PHQ9 QUESTIONS 1 & 2: 0
1. LITTLE INTEREST OR PLEASURE IN DOING THINGS: 0
SUM OF ALL RESPONSES TO PHQ QUESTIONS 1-9: 0
SUM OF ALL RESPONSES TO PHQ QUESTIONS 1-9: 0
2. FEELING DOWN, DEPRESSED OR HOPELESS: 0
SUM OF ALL RESPONSES TO PHQ QUESTIONS 1-9: 0
SUM OF ALL RESPONSES TO PHQ QUESTIONS 1-9: 0

## 2023-04-26 NOTE — PATIENT INSTRUCTIONS
Add L theanine 200 mg an hour before bedtime     keep by nightstand , may take extra dose in middle of night  AMAZON has best love

## 2023-04-26 NOTE — PROGRESS NOTES
This subjective:     Patient ID: Jakob Goncalves is a 75 y.o. male    Diabetes  Pertinent negatives for hypoglycemia include no confusion, dizziness, headaches or nervousness/anxiousness. Pertinent negatives for diabetes include no chest pain and no fatigue. Gout  Associated symptoms include arthralgias. Pertinent negatives include no chest pain, coughing, fatigue, fever, headaches or sore throat. Hypertension  Pertinent negatives include no chest pain, headaches, palpitations or shortness of breath. Sleep Problem  Associated symptoms include arthralgias. Pertinent negatives include no chest pain, coughing, fatigue, fever, headaches or sore throat. Has been off Trulicity for a month because of cost.  He did notice a big difference in terms of suppression of his appetite while he was on the product. However he simply cannot afford it. We have some options for him that our Pharm. D. work done and the patient is going to call his insurance company to see what might be a workable option. Sleep disturbance worse    History of gout but no recent gouty attacks    Also has history of elevated hemoglobin which makes him hyperviscous. I added a baby aspirin to his Plavix regimen. However his cardiologist Dr. Paula Zacarias told him to stop. I will need to call him        Review of Systems   Constitutional:  Negative for activity change, appetite change, fatigue and fever. HENT:  Negative for sore throat. Eyes:  Negative for visual disturbance. Respiratory:  Negative for cough, chest tightness and shortness of breath. Cardiovascular:  Negative for chest pain, palpitations and leg swelling. Gastrointestinal:  Negative for constipation and diarrhea. Genitourinary:  Negative for dysuria and urgency. Musculoskeletal:  Positive for arthralgias and gout. Negative for back pain. Neurological:  Negative for dizziness, syncope and headaches. Hematological:  Does not bruise/bleed easily.

## 2023-05-18 ENCOUNTER — TELEPHONE (OUTPATIENT)
Dept: PRIMARY CARE CLINIC | Age: 68
End: 2023-05-18

## 2023-05-18 NOTE — TELEPHONE ENCOUNTER
Called patient to let him know his Ozempic had arrived at the office; patient verbalized understanding and will stop by to

## 2023-07-14 DIAGNOSIS — E11.65 TYPE 2 DIABETES MELLITUS WITH HYPERGLYCEMIA, WITHOUT LONG-TERM CURRENT USE OF INSULIN (HCC): ICD-10-CM

## 2023-07-14 NOTE — TELEPHONE ENCOUNTER
Last appt 04/26/23  Last refill 10/20/22  Next appt 08/16/23    Pt stated pharmacy had sent for refill but I did not see anything in chart.

## 2023-07-18 DIAGNOSIS — E11.65 TYPE 2 DIABETES MELLITUS WITH HYPERGLYCEMIA, WITHOUT LONG-TERM CURRENT USE OF INSULIN (HCC): Primary | ICD-10-CM

## 2023-07-18 DIAGNOSIS — I10 ESSENTIAL HYPERTENSION: ICD-10-CM

## 2023-07-19 RX ORDER — LISINOPRIL AND HYDROCHLOROTHIAZIDE 25; 20 MG/1; MG/1
TABLET ORAL
Qty: 90 TABLET | Refills: 1 | Status: SHIPPED | OUTPATIENT
Start: 2023-07-19

## 2023-07-31 ENCOUNTER — PATIENT MESSAGE (OUTPATIENT)
Dept: PRIMARY CARE CLINIC | Age: 68
End: 2023-07-31

## 2023-07-31 NOTE — TELEPHONE ENCOUNTER
Called patient and let him know Ladena Offer is out of the office until next Monday. Let patient know there is a walk in provider in the office that he can come in and be seen and evaluated.

## 2023-07-31 NOTE — TELEPHONE ENCOUNTER
From: Lisa Morales  To: Dr. Shena Noel: 7/31/2023 12:24 PM EDT  Subject: Gout    Having a gout attack, was wondering if Dr. Hosea Bergman could call in script without office visit? Detail Level: Detailed Discharge Destination: home Follow-Up: As Needed

## 2023-08-16 ENCOUNTER — OFFICE VISIT (OUTPATIENT)
Dept: PRIMARY CARE CLINIC | Age: 68
End: 2023-08-16
Payer: MEDICARE

## 2023-08-16 VITALS
SYSTOLIC BLOOD PRESSURE: 120 MMHG | HEIGHT: 68 IN | WEIGHT: 188.6 LBS | DIASTOLIC BLOOD PRESSURE: 68 MMHG | HEART RATE: 84 BPM | OXYGEN SATURATION: 95 % | BODY MASS INDEX: 28.58 KG/M2

## 2023-08-16 DIAGNOSIS — M10.9 ACUTE GOUT, UNSPECIFIED CAUSE, UNSPECIFIED SITE: ICD-10-CM

## 2023-08-16 DIAGNOSIS — I10 ESSENTIAL HYPERTENSION: ICD-10-CM

## 2023-08-16 DIAGNOSIS — G47.9 SLEEP DISTURBANCE: ICD-10-CM

## 2023-08-16 DIAGNOSIS — E11.65 TYPE 2 DIABETES MELLITUS WITH HYPERGLYCEMIA, WITHOUT LONG-TERM CURRENT USE OF INSULIN (HCC): Primary | ICD-10-CM

## 2023-08-16 LAB — HBA1C MFR BLD: 7.4 %

## 2023-08-16 PROCEDURE — 99214 OFFICE O/P EST MOD 30 MIN: CPT | Performed by: FAMILY MEDICINE

## 2023-08-16 PROCEDURE — 2022F DILAT RTA XM EVC RTNOPTHY: CPT | Performed by: FAMILY MEDICINE

## 2023-08-16 PROCEDURE — G8417 CALC BMI ABV UP PARAM F/U: HCPCS | Performed by: FAMILY MEDICINE

## 2023-08-16 PROCEDURE — 3051F HG A1C>EQUAL 7.0%<8.0%: CPT | Performed by: FAMILY MEDICINE

## 2023-08-16 PROCEDURE — G8427 DOCREV CUR MEDS BY ELIG CLIN: HCPCS | Performed by: FAMILY MEDICINE

## 2023-08-16 PROCEDURE — 83037 HB GLYCOSYLATED A1C HOME DEV: CPT | Performed by: FAMILY MEDICINE

## 2023-08-16 PROCEDURE — 3074F SYST BP LT 130 MM HG: CPT | Performed by: FAMILY MEDICINE

## 2023-08-16 PROCEDURE — 3078F DIAST BP <80 MM HG: CPT | Performed by: FAMILY MEDICINE

## 2023-08-16 PROCEDURE — 1123F ACP DISCUSS/DSCN MKR DOCD: CPT | Performed by: FAMILY MEDICINE

## 2023-08-16 PROCEDURE — 1036F TOBACCO NON-USER: CPT | Performed by: FAMILY MEDICINE

## 2023-08-16 PROCEDURE — 3017F COLORECTAL CA SCREEN DOC REV: CPT | Performed by: FAMILY MEDICINE

## 2023-08-16 RX ORDER — TEMAZEPAM 15 MG/1
15 CAPSULE ORAL NIGHTLY PRN
Qty: 30 CAPSULE | Refills: 0 | Status: SHIPPED | OUTPATIENT
Start: 2023-08-16 | End: 2023-11-16

## 2023-08-16 RX ORDER — METFORMIN HYDROCHLORIDE 500 MG/1
TABLET, EXTENDED RELEASE ORAL
Qty: 360 TABLET | Refills: 3 | Status: SHIPPED | OUTPATIENT
Start: 2023-08-16

## 2023-08-16 RX ORDER — LISINOPRIL AND HYDROCHLOROTHIAZIDE 25; 20 MG/1; MG/1
1 TABLET ORAL DAILY
Qty: 90 TABLET | Refills: 1 | Status: SHIPPED | OUTPATIENT
Start: 2023-08-16

## 2023-08-16 ASSESSMENT — ENCOUNTER SYMPTOMS
CONSTIPATION: 0
BACK PAIN: 0
SORE THROAT: 0
SHORTNESS OF BREATH: 0
COUGH: 0
DIARRHEA: 0
CHEST TIGHTNESS: 0

## 2023-08-16 NOTE — PROGRESS NOTES
Subjective:     Patient ID: Andi Molina is a 70 y.o. male    Patient returns today for follow-up on his diabetes he has been following the low glycemic index diet has had no symptomatic hypoglycemic spells he is using home blood glucose monitoring as well as a diary is compliant with his medications. Apprised of his GLP however it is changing and he needs to make a change that is more affordable with his insurance company that this needs to be investigated. He is pleased and that is lost about 27 pounds over the past year  Hypertensions been under good control with current medications denies any chest pain shortness of breath palpitations or headaches  For his sleep issues he has been using L-theanine without much help he only took 2 of them. He does have a lot of home stress with his daughter and grandkids moving in with him  Hyperlipidemia continues to take a statin  Overall he feels better this    Review of Systems   Constitutional:  Negative for activity change, appetite change, fatigue and fever. HENT:  Negative for sore throat. Eyes:  Negative for visual disturbance. Respiratory:  Negative for cough, chest tightness and shortness of breath. Cardiovascular:  Negative for chest pain, palpitations and leg swelling. Gastrointestinal:  Negative for constipation and diarrhea. Genitourinary:  Negative for dysuria and urgency. Musculoskeletal:  Negative for back pain. Neurological:  Negative for dizziness, syncope and headaches. Hematological:  Does not bruise/bleed easily. Psychiatric/Behavioral:  Negative for confusion and sleep disturbance. The patient is not nervous/anxious. Objective:     Physical Exam  Vitals and nursing note reviewed. Constitutional:       Appearance: Normal appearance. HENT:      Head: Normocephalic.       Right Ear: External ear normal.      Left Ear: External ear normal.      Nose: Nose normal.      Mouth/Throat:      Mouth: Mucous membranes are

## 2023-08-28 ENCOUNTER — TELEPHONE (OUTPATIENT)
Dept: PRIMARY CARE CLINIC | Age: 68
End: 2023-08-28

## 2023-08-28 NOTE — TELEPHONE ENCOUNTER
Lvm for patient to call office. Received ozempic 1mg from [employer] patient assistance program. Patient can  medication in office. Have patient sign blue packing slip when medication is picked up.

## 2023-10-13 ENCOUNTER — HOSPITAL ENCOUNTER (OUTPATIENT)
Age: 68
Setting detail: SPECIMEN
Discharge: HOME | End: 2023-10-13

## 2023-10-13 DIAGNOSIS — M10.9 ACUTE GOUT, UNSPECIFIED CAUSE, UNSPECIFIED SITE: ICD-10-CM

## 2023-10-13 DIAGNOSIS — G47.9 SLEEP DISTURBANCE: ICD-10-CM

## 2023-10-13 DIAGNOSIS — I10 ESSENTIAL HYPERTENSION: ICD-10-CM

## 2023-10-13 DIAGNOSIS — E11.65 TYPE 2 DIABETES MELLITUS WITH HYPERGLYCEMIA, WITHOUT LONG-TERM CURRENT USE OF INSULIN (HCC): ICD-10-CM

## 2023-10-13 LAB
ALBUMIN SERPL-MCNC: 4.3 G/DL (ref 3.5–5.2)
ALBUMIN/GLOB SERPL: 1.5 {RATIO} (ref 1–2.5)
ALP SERPL-CCNC: 115 U/L (ref 40–129)
ALT SERPL-CCNC: 17 U/L (ref 5–41)
ANION GAP SERPL CALCULATED.3IONS-SCNC: 10 MMOL/L (ref 9–17)
AST SERPL-CCNC: 11 U/L
BASOPHILS # BLD: 0.04 K/UL (ref 0–0.2)
BASOPHILS NFR BLD: 1 % (ref 0–2)
BILIRUB SERPL-MCNC: 0.5 MG/DL (ref 0.3–1.2)
BILIRUB UR QL STRIP: NEGATIVE
BUN SERPL-MCNC: 16 MG/DL (ref 8–23)
CALCIUM SERPL-MCNC: 9.8 MG/DL (ref 8.6–10.4)
CHLORIDE SERPL-SCNC: 102 MMOL/L (ref 98–107)
CHOLEST SERPL-MCNC: 148 MG/DL
CHOLESTEROL/HDL RATIO: 4
CLARITY UR: CLEAR
CO2 SERPL-SCNC: 28 MMOL/L (ref 20–31)
COLOR UR: YELLOW
COMMENT: ABNORMAL
CREAT SERPL-MCNC: 0.6 MG/DL (ref 0.7–1.2)
EOSINOPHIL # BLD: 0.12 K/UL (ref 0–0.44)
EOSINOPHILS RELATIVE PERCENT: 1 % (ref 1–4)
ERYTHROCYTE [DISTWIDTH] IN BLOOD BY AUTOMATED COUNT: 13 % (ref 11.8–14.4)
GFR SERPL CREATININE-BSD FRML MDRD: >60 ML/MIN/1.73M2
GLUCOSE SERPL-MCNC: 163 MG/DL (ref 70–99)
GLUCOSE UR STRIP-MCNC: ABNORMAL MG/DL
HCT VFR BLD AUTO: 52.1 % (ref 40.7–50.3)
HDLC SERPL-MCNC: 37 MG/DL
HGB BLD-MCNC: 17.6 G/DL (ref 13–17)
HGB UR QL STRIP.AUTO: NEGATIVE
IMM GRANULOCYTES # BLD AUTO: 0.03 K/UL (ref 0–0.3)
IMM GRANULOCYTES NFR BLD: 0 %
KETONES UR STRIP-MCNC: NEGATIVE MG/DL
LDLC SERPL CALC-MCNC: 61 MG/DL (ref 0–130)
LEUKOCYTE ESTERASE UR QL STRIP: NEGATIVE
LYMPHOCYTES NFR BLD: 2.46 K/UL (ref 1.1–3.7)
LYMPHOCYTES RELATIVE PERCENT: 28 % (ref 24–43)
MCH RBC QN AUTO: 30.7 PG (ref 25.2–33.5)
MCHC RBC AUTO-ENTMCNC: 33.8 G/DL (ref 28.4–34.8)
MCV RBC AUTO: 90.8 FL (ref 82.6–102.9)
MONOCYTES NFR BLD: 0.61 K/UL (ref 0.1–1.2)
MONOCYTES NFR BLD: 7 % (ref 3–12)
NEUTROPHILS NFR BLD: 63 % (ref 36–65)
NEUTS SEG NFR BLD: 5.5 K/UL (ref 1.5–8.1)
NITRITE UR QL STRIP: NEGATIVE
NRBC BLD-RTO: 0 PER 100 WBC
PH UR STRIP: 5 [PH] (ref 5–8)
PLATELET # BLD AUTO: 206 K/UL (ref 138–453)
PMV BLD AUTO: 9.7 FL (ref 8.1–13.5)
POTASSIUM SERPL-SCNC: 4.6 MMOL/L (ref 3.7–5.3)
PROT SERPL-MCNC: 7.2 G/DL (ref 6.4–8.3)
PROT UR STRIP-MCNC: NEGATIVE MG/DL
RBC # BLD AUTO: 5.74 M/UL (ref 4.21–5.77)
SODIUM SERPL-SCNC: 140 MMOL/L (ref 135–144)
SP GR UR STRIP: 1.03 (ref 1–1.03)
TRIGL SERPL-MCNC: 251 MG/DL
URATE SERPL-MCNC: 5.6 MG/DL (ref 3.4–7)
UROBILINOGEN UR STRIP-ACNC: NORMAL EU/DL (ref 0–1)
WBC OTHER # BLD: 8.8 K/UL (ref 3.5–11.3)

## 2023-10-24 ENCOUNTER — OFFICE VISIT (OUTPATIENT)
Dept: PRIMARY CARE CLINIC | Age: 68
End: 2023-10-24

## 2023-10-24 VITALS
HEIGHT: 68 IN | HEART RATE: 79 BPM | BODY MASS INDEX: 28.89 KG/M2 | WEIGHT: 190.6 LBS | OXYGEN SATURATION: 96 % | DIASTOLIC BLOOD PRESSURE: 68 MMHG | SYSTOLIC BLOOD PRESSURE: 124 MMHG

## 2023-10-24 DIAGNOSIS — Z23 NEED FOR INFLUENZA VACCINATION: ICD-10-CM

## 2023-10-24 DIAGNOSIS — I10 ESSENTIAL HYPERTENSION: ICD-10-CM

## 2023-10-24 DIAGNOSIS — Z91.89 AT RISK FOR CARDIOVASCULAR EVENT: ICD-10-CM

## 2023-10-24 DIAGNOSIS — Z00.00 MEDICARE ANNUAL WELLNESS VISIT, SUBSEQUENT: Primary | ICD-10-CM

## 2023-10-24 DIAGNOSIS — G47.9 SLEEP DISTURBANCE: ICD-10-CM

## 2023-10-24 DIAGNOSIS — Z87.39 HISTORY OF GOUT: ICD-10-CM

## 2023-10-24 DIAGNOSIS — E11.65 TYPE 2 DIABETES MELLITUS WITH HYPERGLYCEMIA, WITHOUT LONG-TERM CURRENT USE OF INSULIN (HCC): ICD-10-CM

## 2023-10-24 DIAGNOSIS — J44.9 CHRONIC OBSTRUCTIVE PULMONARY DISEASE, UNSPECIFIED COPD TYPE (HCC): ICD-10-CM

## 2023-10-24 LAB — HBA1C MFR BLD: 7.7 %

## 2023-10-24 RX ORDER — LISINOPRIL AND HYDROCHLOROTHIAZIDE 25; 20 MG/1; MG/1
1 TABLET ORAL DAILY
Qty: 90 TABLET | Refills: 1 | Status: SHIPPED | OUTPATIENT
Start: 2023-10-24

## 2023-10-24 RX ORDER — METFORMIN HYDROCHLORIDE 500 MG/1
TABLET, EXTENDED RELEASE ORAL
Qty: 360 TABLET | Refills: 3 | Status: SHIPPED | OUTPATIENT
Start: 2023-10-24

## 2023-10-24 RX ORDER — TEMAZEPAM 15 MG/1
15 CAPSULE ORAL NIGHTLY PRN
Qty: 30 CAPSULE | Refills: 0 | Status: SHIPPED | OUTPATIENT
Start: 2023-10-24 | End: 2024-01-24

## 2023-10-24 ASSESSMENT — ENCOUNTER SYMPTOMS
SHORTNESS OF BREATH: 0
SORE THROAT: 0
CHEST TIGHTNESS: 0
COUGH: 0
BACK PAIN: 0
CONSTIPATION: 0
DIARRHEA: 0

## 2023-10-24 ASSESSMENT — PATIENT HEALTH QUESTIONNAIRE - PHQ9
SUM OF ALL RESPONSES TO PHQ QUESTIONS 1-9: 0
SUM OF ALL RESPONSES TO PHQ QUESTIONS 1-9: 0
2. FEELING DOWN, DEPRESSED OR HOPELESS: 0
SUM OF ALL RESPONSES TO PHQ QUESTIONS 1-9: 0
1. LITTLE INTEREST OR PLEASURE IN DOING THINGS: 0
SUM OF ALL RESPONSES TO PHQ9 QUESTIONS 1 & 2: 0
SUM OF ALL RESPONSES TO PHQ QUESTIONS 1-9: 0

## 2023-10-24 ASSESSMENT — LIFESTYLE VARIABLES
HOW MANY STANDARD DRINKS CONTAINING ALCOHOL DO YOU HAVE ON A TYPICAL DAY: 1 OR 2
HOW OFTEN DO YOU HAVE A DRINK CONTAINING ALCOHOL: MONTHLY OR LESS

## 2023-10-24 NOTE — PROGRESS NOTES
use     Reviewed anti-inflammatory diet choices   150 minutes of exercise per week  Reviewed use of statins to reduce cardiac events  Up to 15 minutes was spent on this counseling  Detailed handouts given    Type 2 diabetes mellitus with hyperglycemia, without long-term current use of insulin (HCC)  -     POCT glycosylated hemoglobin (Hb A1C)  -     metFORMIN (GLUCOPHAGE-XR) 500 MG extended release tablet; Take 2 tablets by mouth with breakfast and 2 with supper  We will double his Ozempic dose. We will change Guadalupe to Fiez. Sleep disturbance  -     temazepam (RESTORIL) 15 MG capsule; Take 1 capsule by mouth nightly as needed for Sleep for up to 92 days. Max Daily Amount: 15 mg    Essential hypertension  -     lisinopril-hydroCHLOROthiazide (PRINZIDE;ZESTORETIC) 20-25 MG per tablet; Take 1 tablet by mouth daily  Avoid added salt and excess caffeine  Chronic obstructive pulmonary disease, unspecified COPD type ([address])  Continue as needed rescue inhaler  BMI 28.0-28.9,adult  Reinforced low glycemic index high-fiber diet  History of gout  Continue allopurinol. Has Daypro on hand for gouty attack  Need for influenza vaccination  -     Influenza, FLUAD, (age 72 y+), IM, Preservative Free, 0.5 mL    Other orders  -     oxaprozin (DAYPRO) 600 MG tablet; Take 1 tablet by mouth daily Take 1 pill with meals 3 times a day for 2 days then take 1 pill/day for 10 days  -     empagliflozin (JARDIANCE) 25 MG tablet; Take 1 tablet by mouth daily    Return to office 3 months      Orquidea Clement MD    Please note that this chart was generated using voice recognition Dragon dictation software. Although every effort was made to ensure the accuracy of this automated transcription, some errors in transcription may have occurred.

## 2023-10-24 NOTE — PATIENT INSTRUCTIONS
ordered immunizations, labs, imaging, and/or referrals for you. A list of these orders (if applicable) as well as your Preventive Care list are included within your After Visit Summary for your review. Other Preventive Recommendations:    A preventive eye exam performed by an eye specialist is recommended every 1-2 years to screen for glaucoma; cataracts, macular degeneration, and other eye disorders. A preventive dental visit is recommended every 6 months. Try to get at least 150 minutes of exercise per week or 10,000 steps per day on a pedometer . Order or download the FREE \"Exercise & Physical Activity: Your Everyday Guide\" from The [employer]. Call 2-698.461.5959 or search The The [employer] online. You need 9363-0600 mg of calcium and 1383-9421 IU of vitamin D per day. It is possible to meet your calcium requirement with diet alone, but a vitamin D supplement is usually necessary to meet this goal.  When exposed to the sun, use a sunscreen that protects against both UVA and UVB radiation with an SPF of 30 or greater. Reapply every 2 to 3 hours or after sweating, drying off with a towel, or swimming. Always wear a seat belt when traveling in a car. Always wear a helmet when riding a bicycle or motorcycle.

## 2023-10-25 ENCOUNTER — PATIENT MESSAGE (OUTPATIENT)
Dept: PRIMARY CARE CLINIC | Age: 68
End: 2023-10-25

## 2023-10-27 NOTE — TELEPHONE ENCOUNTER
From: Laverne Tay  To: Dr. Micaela Hussein: 10/25/2023 12:02 PM EDT  Subject: Harriet Fiddler 25 mg    can you fax script to [facility] ?    Harriet Fiddler is just as expensive as Gudelia Balls

## 2023-12-08 ENCOUNTER — TELEPHONE (OUTPATIENT)
Dept: PRIMARY CARE CLINIC | Age: 68
End: 2023-12-08

## 2023-12-08 NOTE — TELEPHONE ENCOUNTER
Pt stopped by to  his pt assistance Ozempic, they had shipped the wrong dose of 1mg X 2 boxes and then shipped 2mg X 3 boxes.  Pt was given all the supply shipped today

## 2024-01-31 ENCOUNTER — OFFICE VISIT (OUTPATIENT)
Dept: PRIMARY CARE CLINIC | Age: 69
End: 2024-01-31
Payer: MEDICARE

## 2024-01-31 VITALS
HEART RATE: 81 BPM | OXYGEN SATURATION: 93 % | DIASTOLIC BLOOD PRESSURE: 76 MMHG | SYSTOLIC BLOOD PRESSURE: 124 MMHG | WEIGHT: 189.4 LBS | BODY MASS INDEX: 28.7 KG/M2 | HEIGHT: 68 IN

## 2024-01-31 DIAGNOSIS — R06.2 WHEEZING: ICD-10-CM

## 2024-01-31 DIAGNOSIS — Z87.39 HISTORY OF GOUT: ICD-10-CM

## 2024-01-31 DIAGNOSIS — J02.9 ACUTE PHARYNGITIS, UNSPECIFIED ETIOLOGY: ICD-10-CM

## 2024-01-31 DIAGNOSIS — I10 ESSENTIAL HYPERTENSION: ICD-10-CM

## 2024-01-31 DIAGNOSIS — R05.1 ACUTE COUGH: ICD-10-CM

## 2024-01-31 DIAGNOSIS — J44.1 COPD WITH ACUTE EXACERBATION (HCC): ICD-10-CM

## 2024-01-31 DIAGNOSIS — J20.9 ACUTE BRONCHITIS, UNSPECIFIED ORGANISM: Primary | ICD-10-CM

## 2024-01-31 DIAGNOSIS — E11.65 TYPE 2 DIABETES MELLITUS WITH HYPERGLYCEMIA, WITHOUT LONG-TERM CURRENT USE OF INSULIN (HCC): ICD-10-CM

## 2024-01-31 LAB — HBA1C MFR BLD: 6.6 %

## 2024-01-31 PROCEDURE — 3044F HG A1C LEVEL LT 7.0%: CPT | Performed by: FAMILY MEDICINE

## 2024-01-31 PROCEDURE — G8484 FLU IMMUNIZE NO ADMIN: HCPCS | Performed by: FAMILY MEDICINE

## 2024-01-31 PROCEDURE — 3074F SYST BP LT 130 MM HG: CPT | Performed by: FAMILY MEDICINE

## 2024-01-31 PROCEDURE — 3078F DIAST BP <80 MM HG: CPT | Performed by: FAMILY MEDICINE

## 2024-01-31 PROCEDURE — 3023F SPIROM DOC REV: CPT | Performed by: FAMILY MEDICINE

## 2024-01-31 PROCEDURE — 3017F COLORECTAL CA SCREEN DOC REV: CPT | Performed by: FAMILY MEDICINE

## 2024-01-31 PROCEDURE — 1123F ACP DISCUSS/DSCN MKR DOCD: CPT | Performed by: FAMILY MEDICINE

## 2024-01-31 PROCEDURE — G8427 DOCREV CUR MEDS BY ELIG CLIN: HCPCS | Performed by: FAMILY MEDICINE

## 2024-01-31 PROCEDURE — 2022F DILAT RTA XM EVC RTNOPTHY: CPT | Performed by: FAMILY MEDICINE

## 2024-01-31 PROCEDURE — 1036F TOBACCO NON-USER: CPT | Performed by: FAMILY MEDICINE

## 2024-01-31 PROCEDURE — G8417 CALC BMI ABV UP PARAM F/U: HCPCS | Performed by: FAMILY MEDICINE

## 2024-01-31 PROCEDURE — 99214 OFFICE O/P EST MOD 30 MIN: CPT | Performed by: FAMILY MEDICINE

## 2024-01-31 PROCEDURE — 96372 THER/PROPH/DIAG INJ SC/IM: CPT | Performed by: FAMILY MEDICINE

## 2024-01-31 PROCEDURE — 83036 HEMOGLOBIN GLYCOSYLATED A1C: CPT | Performed by: FAMILY MEDICINE

## 2024-01-31 RX ORDER — LISINOPRIL AND HYDROCHLOROTHIAZIDE 25; 20 MG/1; MG/1
1 TABLET ORAL DAILY
Qty: 90 TABLET | Refills: 1 | Status: SHIPPED | OUTPATIENT
Start: 2024-01-31

## 2024-01-31 RX ORDER — METFORMIN HYDROCHLORIDE 500 MG/1
TABLET, EXTENDED RELEASE ORAL
Qty: 360 TABLET | Refills: 3 | Status: SHIPPED | OUTPATIENT
Start: 2024-01-31

## 2024-01-31 RX ORDER — DOXYCYCLINE HYCLATE 100 MG
100 TABLET ORAL 2 TIMES DAILY
Qty: 20 TABLET | Refills: 0 | Status: SHIPPED | OUTPATIENT
Start: 2024-01-31 | End: 2024-02-10

## 2024-01-31 RX ORDER — PERPHENAZINE 8 MG
600 TABLET ORAL 2 TIMES DAILY
Qty: 120 CAPSULE | Refills: 3 | COMMUNITY
Start: 2024-01-31

## 2024-01-31 RX ORDER — LISINOPRIL 10 MG/1
10 TABLET ORAL DAILY
Qty: 90 TABLET | Refills: 3 | Status: SHIPPED | OUTPATIENT
Start: 2024-01-31

## 2024-01-31 RX ORDER — CEFTRIAXONE 1 G/1
1000 INJECTION, POWDER, FOR SOLUTION INTRAMUSCULAR; INTRAVENOUS ONCE
Status: COMPLETED | OUTPATIENT
Start: 2024-01-31 | End: 2024-01-31

## 2024-01-31 RX ORDER — ALBUTEROL SULFATE 2.5 MG/3ML
2.5 SOLUTION RESPIRATORY (INHALATION) EVERY 6 HOURS PRN
Qty: 120 EACH | Refills: 1 | Status: SHIPPED | OUTPATIENT
Start: 2024-01-31 | End: 2024-07-29

## 2024-01-31 RX ORDER — CODEINE PHOSPHATE/GUAIFENESIN 10-100MG/5
5 LIQUID (ML) ORAL 4 TIMES DAILY PRN
Qty: 120 ML | Refills: 0 | Status: SHIPPED | OUTPATIENT
Start: 2024-01-31 | End: 2024-02-06

## 2024-01-31 RX ORDER — ALBUTEROL SULFATE 90 UG/1
2 AEROSOL, METERED RESPIRATORY (INHALATION) EVERY 6 HOURS PRN
Qty: 18 G | Refills: 3 | Status: SHIPPED | OUTPATIENT
Start: 2024-01-31

## 2024-01-31 RX ORDER — ALBUTEROL SULFATE 2.5 MG/3ML
2.5 SOLUTION RESPIRATORY (INHALATION) EVERY 6 HOURS PRN
Qty: 120 EACH | Refills: 0 | Status: SHIPPED | OUTPATIENT
Start: 2024-01-31 | End: 2024-01-31 | Stop reason: SDUPTHER

## 2024-01-31 RX ADMIN — CEFTRIAXONE 1000 MG: 1 INJECTION, POWDER, FOR SOLUTION INTRAMUSCULAR; INTRAVENOUS at 07:54

## 2024-01-31 ASSESSMENT — PATIENT HEALTH QUESTIONNAIRE - PHQ9
2. FEELING DOWN, DEPRESSED OR HOPELESS: 0
SUM OF ALL RESPONSES TO PHQ QUESTIONS 1-9: 0
1. LITTLE INTEREST OR PLEASURE IN DOING THINGS: 0
SUM OF ALL RESPONSES TO PHQ QUESTIONS 1-9: 0
SUM OF ALL RESPONSES TO PHQ9 QUESTIONS 1 & 2: 0
SUM OF ALL RESPONSES TO PHQ QUESTIONS 1-9: 0

## 2024-01-31 NOTE — TELEPHONE ENCOUNTER
Per the [facility]   Albuterol 2.5 mg  needs 120 vital not each - medicare dose not cover it , (I think I change it to 120 ml was the only choice I had.)   Ipratropium 0.02% dispense 2.5 ml will only give one dose per the pharmacy needs 90 ml .   Please review thank you

## 2024-01-31 NOTE — PROGRESS NOTES
Subjective:     Patient ID: Osman Madrid is a 68 y.o. male    HPI  Patient returns today for recheck on his diabetes hypertension COPD.  Which really bothering him today is a 2 and half week history of congestion sinus headaches pressure low-grade fever sore throat purulent sputum.  Denies any chest pain but has had some wheezing.  Has not been able to conquer it with over-the-counter products  Diabetes has been under good control on current medications however the Farxiga/Jardiance has become very expensive for him and he like a prescription sent to [facility] to make it more affordable.  Hypertensions under good control.  Denies chest pain palpitations    Has a history of gout he continues to take Daypro when he has any flareup    Sleep disturbance is controlled with temazepam which he does not take every night    Reinforced low glycemic index high-fiber diet.  Has done well with weight loss      Review of Systems   Constitutional:  Negative for activity change, appetite change, fatigue and fever.   HENT:  Positive for rhinorrhea, sinus pressure, sinus pain and sore throat.    Eyes:  Negative for visual disturbance.   Respiratory:  Positive for cough, shortness of breath and wheezing. Negative for chest tightness.    Cardiovascular:  Negative for chest pain, palpitations and leg swelling.   Gastrointestinal:  Negative for constipation and diarrhea.   Genitourinary:  Negative for dysuria and urgency.   Musculoskeletal:  Negative for back pain.   Neurological:  Negative for dizziness, syncope and headaches.   Hematological:  Does not bruise/bleed easily.   Psychiatric/Behavioral:  Negative for confusion and sleep disturbance. The patient is not nervous/anxious.            Objective:     Physical Exam  Vitals and nursing note reviewed.   Constitutional:       Appearance: Normal appearance.   HENT:      Head: Normocephalic.      Right Ear: External ear normal.      Left Ear: External ear normal.      Nose: Congestion

## 2024-01-31 NOTE — PATIENT INSTRUCTIONS
N acetyl cysteine   NAC    600 mg twice daily to help with mucus    Listerine gargles 3 times daily  Vitamin D 10,000 international units daily for 3 days  Vitamin C 1000 mg daily  Steam inhalation, rest

## 2024-02-01 DIAGNOSIS — E11.65 TYPE 2 DIABETES MELLITUS WITH HYPERGLYCEMIA, WITHOUT LONG-TERM CURRENT USE OF INSULIN (HCC): Primary | ICD-10-CM

## 2024-02-02 ASSESSMENT — ENCOUNTER SYMPTOMS
CONSTIPATION: 0
BACK PAIN: 0
SINUS PAIN: 1
WHEEZING: 1
SHORTNESS OF BREATH: 1
SORE THROAT: 1
CHEST TIGHTNESS: 0
SINUS PRESSURE: 1
COUGH: 1
DIARRHEA: 0
RHINORRHEA: 1

## 2024-03-22 ENCOUNTER — TELEPHONE (OUTPATIENT)
Dept: PRIMARY CARE CLINIC | Age: 69
End: 2024-03-22

## 2024-03-22 NOTE — TELEPHONE ENCOUNTER
Osman is establishing with Dr Hurtado in May but he is needing his Ozempic that he would  from the office through patient assistance program.

## 2024-03-22 NOTE — TELEPHONE ENCOUNTER
New form filled out and signed by Dr. Veronika Berry. Faxed over, and form is scanned into chart. Called patient to let him know the status and we will call when his shipment arrives. Pt. Verbalized understanding.

## 2024-05-03 ENCOUNTER — HOSPITAL ENCOUNTER (OUTPATIENT)
Age: 69
Setting detail: SPECIMEN
Discharge: HOME | End: 2024-05-03

## 2024-05-03 ENCOUNTER — OFFICE VISIT (OUTPATIENT)
Dept: PRIMARY CARE CLINIC | Age: 69
End: 2024-05-03

## 2024-05-03 VITALS
OXYGEN SATURATION: 96 % | SYSTOLIC BLOOD PRESSURE: 124 MMHG | HEIGHT: 68 IN | BODY MASS INDEX: 29.4 KG/M2 | WEIGHT: 194 LBS | HEART RATE: 70 BPM | DIASTOLIC BLOOD PRESSURE: 62 MMHG

## 2024-05-03 DIAGNOSIS — M79.89 MASS OF SOFT TISSUE OF CHEST: ICD-10-CM

## 2024-05-03 DIAGNOSIS — D58.2 ELEVATED HEMOGLOBIN (HCC): ICD-10-CM

## 2024-05-03 DIAGNOSIS — E11.65 TYPE 2 DIABETES MELLITUS WITH HYPERGLYCEMIA, WITHOUT LONG-TERM CURRENT USE OF INSULIN (HCC): Primary | ICD-10-CM

## 2024-05-03 DIAGNOSIS — E78.5 HYPERLIPIDEMIA ASSOCIATED WITH TYPE 2 DIABETES MELLITUS (HCC): ICD-10-CM

## 2024-05-03 DIAGNOSIS — B35.4 TINEA CORPORIS: ICD-10-CM

## 2024-05-03 DIAGNOSIS — E11.69 HYPERLIPIDEMIA ASSOCIATED WITH TYPE 2 DIABETES MELLITUS (HCC): ICD-10-CM

## 2024-05-03 DIAGNOSIS — E11.65 TYPE 2 DIABETES MELLITUS WITH HYPERGLYCEMIA, WITHOUT LONG-TERM CURRENT USE OF INSULIN (HCC): ICD-10-CM

## 2024-05-03 LAB
BASOPHILS # BLD: 0.03 K/UL (ref 0–0.2)
BASOPHILS NFR BLD: 0 % (ref 0–2)
CREAT UR-MCNC: 26.4 MG/DL (ref 39–259)
EOSINOPHIL # BLD: 0.09 K/UL (ref 0–0.44)
EOSINOPHILS RELATIVE PERCENT: 1 % (ref 1–4)
ERYTHROCYTE [DISTWIDTH] IN BLOOD BY AUTOMATED COUNT: 12.9 % (ref 11.8–14.4)
HBA1C MFR BLD: 7.9 %
HCT VFR BLD AUTO: 49.2 % (ref 40.7–50.3)
HGB BLD-MCNC: 16.3 G/DL (ref 13–17)
IMM GRANULOCYTES # BLD AUTO: <0.03 K/UL (ref 0–0.3)
IMM GRANULOCYTES NFR BLD: 0 %
LYMPHOCYTES NFR BLD: 1.98 K/UL (ref 1.1–3.7)
LYMPHOCYTES RELATIVE PERCENT: 24 % (ref 24–43)
MCH RBC QN AUTO: 29.3 PG (ref 25.2–33.5)
MCHC RBC AUTO-ENTMCNC: 33.1 G/DL (ref 28.4–34.8)
MCV RBC AUTO: 88.3 FL (ref 82.6–102.9)
MICROALBUMIN UR-MCNC: 42 MG/L (ref 0–20)
MICROALBUMIN/CREAT UR-RTO: 159 MCG/MG CREAT (ref 0–17)
MONOCYTES NFR BLD: 0.5 K/UL (ref 0.1–1.2)
MONOCYTES NFR BLD: 6 % (ref 3–12)
NEUTROPHILS NFR BLD: 69 % (ref 36–65)
NEUTS SEG NFR BLD: 5.53 K/UL (ref 1.5–8.1)
NRBC BLD-RTO: 0 PER 100 WBC
PLATELET # BLD AUTO: 235 K/UL (ref 138–453)
PMV BLD AUTO: 9.6 FL (ref 8.1–13.5)
RBC # BLD AUTO: 5.57 M/UL (ref 4.21–5.77)
WBC OTHER # BLD: 8.2 K/UL (ref 3.5–11.3)

## 2024-05-03 RX ORDER — CLOTRIMAZOLE 1 %
CREAM (GRAM) TOPICAL
Qty: 45 G | Refills: 1 | Status: SHIPPED | OUTPATIENT
Start: 2024-05-03 | End: 2024-05-10

## 2024-05-03 RX ORDER — DAPAGLIFLOZIN 10 MG/1
10 TABLET, FILM COATED ORAL EVERY MORNING
COMMUNITY
Start: 2023-01-17 | End: 2024-05-03

## 2024-05-03 SDOH — ECONOMIC STABILITY: INCOME INSECURITY: HOW HARD IS IT FOR YOU TO PAY FOR THE VERY BASICS LIKE FOOD, HOUSING, MEDICAL CARE, AND HEATING?: NOT HARD AT ALL

## 2024-05-03 SDOH — ECONOMIC STABILITY: FOOD INSECURITY: WITHIN THE PAST 12 MONTHS, YOU WORRIED THAT YOUR FOOD WOULD RUN OUT BEFORE YOU GOT MONEY TO BUY MORE.: NEVER TRUE

## 2024-05-03 SDOH — ECONOMIC STABILITY: FOOD INSECURITY: WITHIN THE PAST 12 MONTHS, THE FOOD YOU BOUGHT JUST DIDN'T LAST AND YOU DIDN'T HAVE MONEY TO GET MORE.: NEVER TRUE

## 2024-05-03 ASSESSMENT — ENCOUNTER SYMPTOMS
ABDOMINAL PAIN: 0
COUGH: 0
CONSTIPATION: 0
SHORTNESS OF BREATH: 0
VOMITING: 0
WHEEZING: 0
DIARRHEA: 1
BLOOD IN STOOL: 0
NAUSEA: 0

## 2024-05-03 NOTE — ASSESSMENT & PLAN NOTE
Presumably secondary to chronic hypoxemia from LUIS ANGEL, COPD; will order JAK2 mutation to rule out polycythemia vera.  Discussed possible referral to sleep medicine for consideration of inspire system; patient given website to research and see if he might be interested in this.  Will revisit at follow-up.

## 2024-05-03 NOTE — ASSESSMENT & PLAN NOTE
Suboptimally controlled due to interruption of Ozempic.  Patient with history of coronary artery disease, would benefit from taking a GLP-1 agonist or SGLT2 and inhibitor; will coordinate with patient assistance as well as [facility] for this medication.  If neither of these options are viable, he was previously tolerating glimepiride and we will return to this.

## 2024-05-03 NOTE — PROGRESS NOTES
[facility]      Date of Visit:  5/3/2024  Patient Name: Osman Madrid   Patient :  1955     CHIEF COMPLAINT:     Osman Madrid is a 68 y.o. male who presents today to be evaluated for the following condition(s):  Chief Complaint   Patient presents with    New Patient     Est care with provider - DM, lump on L breast, rash on L thigh    Last PCP - Dr. Hubbard  LOV - 24  Last labs - 24 lipids ordered by Silvino Padgett    Diabetes     Diabetes Mellitus Type II, Follow-up: Osman is here for follow-up of Type 2 diabetes mellitus. Is Osman using medication(s) as prescribed? yes.  Does Osman have any questions related to the  treatment of this medical condition? Ozempic Rx.  Current monitoring regimen: home blood tests - daily, 130 this morning    Lab Results       Component                Value               Date                       LABA1C                   6.6                 2024                        HISTORY OF PRESENT ILLNESS:      HPI   Patient is presenting today to establish and with the above complaints.    Patient is a former patient of Dr. Hubbard who I am seeing for the first time today.    Lump under left breast; first noticed approximately 1 year ago; has gotten bigger and more firm over the past 3 months; not painful; doesn't fluctuate in sized, drain or bleed.    Rash; patient has noted circular, pruritic rash involving his left inner thigh; has been present for the past several months; refractory to over-the-counter hydrocortisone cream, similar appearing but smaller rash just beginning on his right inner thigh at the same level.    T2DM; HbgA1c is elevated today at 7.9; last 6.6 although patient swears it was worse; typically averaging mid to upper 7's; has been without ozempic for the past month due to interruption of patient assistance, previous PCP had been working on sending an Rx for Jardiance to a [place] pharmacy but for some reason, this fell through; Rx

## 2024-05-06 ENCOUNTER — TELEPHONE (OUTPATIENT)
Dept: PRIMARY CARE CLINIC | Age: 69
End: 2024-05-06

## 2024-05-06 DIAGNOSIS — B35.4 TINEA CORPORIS: ICD-10-CM

## 2024-05-06 RX ORDER — CLOTRIMAZOLE 1 %
CREAM (GRAM) TOPICAL
Qty: 45 G | Refills: 1 | Status: SHIPPED | OUTPATIENT
Start: 2024-05-06 | End: 2024-05-13

## 2024-05-06 NOTE — TELEPHONE ENCOUNTER
----- Message from Vanessa Mo sent at 5/6/2024 10:05 AM EDT -----  Subject: Medication Problem     Medication: clotrimazole (LOTRIMIN AF) 1 % cream  Dosage: Apply 2 times per day   Ordering Provider: Master Hurtado    Question/Problem: Patient states this medication went to the wrong   pharmacy. Patient us requesting this be sent to the [facility] in   [place].       Pharmacy: [facility] [id number] - [facility] [address] - P [phone] - F [phone]    ---------------------------------------------------------------------------  --------------  CALL BACK INFO  [phone]; OK to leave message on voicemail,OK to respond with electronic   message via [employer] portal (only for patients who have registered [employer]   account)  ---------------------------------------------------------------------------  --------------    SCRIPT ANSWERS  Relationship to Patient: Self

## 2024-05-07 ENCOUNTER — TELEPHONE (OUTPATIENT)
Dept: SURGERY | Age: 69
End: 2024-05-07

## 2024-05-07 NOTE — TELEPHONE ENCOUNTER
5/7/24- [facility] (1st attempt) to schedule new patient visit with Dr. Richmond.

## 2024-05-09 ENCOUNTER — PATIENT MESSAGE (OUTPATIENT)
Dept: PRIMARY CARE CLINIC | Age: 69
End: 2024-05-09

## 2024-05-09 NOTE — TELEPHONE ENCOUNTER
From: Osman Madrid  To: Dr. Master Hurtado  Sent: 5/9/2024 9:06 AM EDT  Subject: Blood sugar levels    My resting blood sugar levels are now in the 180 range.   Jardiance has shipped should be here in a couple of weeks.

## 2024-05-13 ENCOUNTER — TELEPHONE (OUTPATIENT)
Dept: SURGERY | Age: 69
End: 2024-05-13

## 2024-05-13 ENCOUNTER — OFFICE VISIT (OUTPATIENT)
Dept: SURGERY | Age: 69
End: 2024-05-13
Payer: MEDICARE

## 2024-05-13 VITALS
BODY MASS INDEX: 29.95 KG/M2 | WEIGHT: 197 LBS | SYSTOLIC BLOOD PRESSURE: 140 MMHG | HEART RATE: 68 BPM | DIASTOLIC BLOOD PRESSURE: 73 MMHG

## 2024-05-13 DIAGNOSIS — M79.89 MASS OF SOFT TISSUE OF CHEST: Primary | ICD-10-CM

## 2024-05-13 PROCEDURE — 3078F DIAST BP <80 MM HG: CPT | Performed by: SURGERY

## 2024-05-13 PROCEDURE — 99203 OFFICE O/P NEW LOW 30 MIN: CPT | Performed by: SURGERY

## 2024-05-13 PROCEDURE — 3017F COLORECTAL CA SCREEN DOC REV: CPT | Performed by: SURGERY

## 2024-05-13 PROCEDURE — 1123F ACP DISCUSS/DSCN MKR DOCD: CPT | Performed by: SURGERY

## 2024-05-13 PROCEDURE — G8427 DOCREV CUR MEDS BY ELIG CLIN: HCPCS | Performed by: SURGERY

## 2024-05-13 PROCEDURE — G8417 CALC BMI ABV UP PARAM F/U: HCPCS | Performed by: SURGERY

## 2024-05-13 PROCEDURE — 3077F SYST BP >= 140 MM HG: CPT | Performed by: SURGERY

## 2024-05-13 PROCEDURE — 1036F TOBACCO NON-USER: CPT | Performed by: SURGERY

## 2024-05-13 NOTE — PROGRESS NOTES
[facility] General Surgery  Clinic Evaluation  Rosales Richmond DO    Pt Name: Osman Madrid  MRN: [id number]  YOB: 1955  Date of evaluation: 5/13/2024  Primary Care Physician: Master Hurtado DO    Chief Complaint: Left chest wall subcutaneous mass      SUBJECTIVE:    History of Present Illness:     This is a 68 y.o.  male who presents for evaluation for the above, masses and present for many months, was evaluated by his PCP and referred to me for further evaluation.  Denies history of trauma to this area, no history of bleeding or infection, no prior attempts at excision.  Non-smoker.     Chart review performed to add information to the HPI: Yes    Past Medical History   has a past medical history of COPD (chronic obstructive pulmonary disease) (HCC), Diabetes mellitus (HCC), and Hypertension.    Past Surgical History   has a past surgical history that includes Colonoscopy (11/29/2021) and Colonoscopy (N/A, 11/29/2021).    Family History  family history includes Colon Cancer (age of onset: 74) in his brother; Colon Cancer (age of onset: 80) in his father; No Known Problems in his mother; Suicide in his brother.    Social History  Tobacco use:  reports that he has never smoked. He has never used smokeless tobacco.  Alcohol use:  reports current alcohol use of about 1.0 standard drink of alcohol per week.  Drug use:  reports no history of drug use.      Medications  Current Medications:   Current Outpatient Medications   Medication Sig Dispense Refill    clotrimazole (LOTRIMIN AF) 1 % cream Apply topically 2 times daily. 45 g 1    empagliflozin (JARDIANCE) 25 MG tablet Take 1 tablet by mouth daily 90 tablet 3    metFORMIN (GLUCOPHAGE-XR) 500 MG extended release tablet Take 2 tablets by mouth with breakfast and 2 with supper 360 tablet 3    lisinopril-hydroCHLOROthiazide (PRINZIDE;ZESTORETIC) 20-25 MG per tablet Take 1 tablet by mouth daily 90 tablet 1    albuterol sulfate HFA (PROVENTIL HFA) 108

## 2024-05-13 NOTE — TELEPHONE ENCOUNTER
Spoke to patient, surgery scheduled at [facility]. Patient confirmed and information given to patient(s) at clinic visit.    Surgery date/time: 5/31/24 at 10:30am  Arrival time: 9am  PAT: not needed for local anesthesia  Post op: 6/17/24 at 1:40pm

## 2024-05-14 LAB
JAK2 P.V617F BLD/T QL: NOT DETECTED
SPECIMEN SOURCE: NORMAL

## 2024-05-15 ENCOUNTER — PATIENT MESSAGE (OUTPATIENT)
Dept: PRIMARY CARE CLINIC | Age: 69
End: 2024-05-15

## 2024-05-15 LAB
MPL GENE MUT TESTED BLD/T: NOT DETECTED
SPECIMEN SOURCE: NORMAL

## 2024-05-15 RX ORDER — TRIAMCINOLONE ACETONIDE 1 MG/G
CREAM TOPICAL 2 TIMES DAILY
Qty: 45 G | Refills: 0 | Status: SHIPPED | OUTPATIENT
Start: 2024-05-15 | End: 2024-05-29

## 2024-05-16 LAB
GENE XXX MUT ANL BLD/T: NOT DETECTED
SPECIMEN SOURCE: NORMAL

## 2024-05-29 NOTE — PRE-PROCEDURE INSTRUCTIONS
PAT phone call for local anesthetic procedure completed with pt.    Date/time/location (entrance C) of surgery/procedure verified with pt.    Instructed pt to take a shower the AM of surgery/procedure and to avoid lotions, creams, jewelry.    Instructed pt to take BP meds prior to procedure/surgery.

## 2024-05-31 ENCOUNTER — HOSPITAL ENCOUNTER (OUTPATIENT)
Age: 69
Setting detail: OUTPATIENT SURGERY
Discharge: HOME | End: 2024-05-31
Attending: SURGERY | Admitting: SURGERY
Payer: MEDICARE

## 2024-05-31 VITALS
BODY MASS INDEX: 28.79 KG/M2 | OXYGEN SATURATION: 96 % | SYSTOLIC BLOOD PRESSURE: 137 MMHG | HEART RATE: 68 BPM | HEIGHT: 68 IN | TEMPERATURE: 97.9 F | RESPIRATION RATE: 14 BRPM | DIASTOLIC BLOOD PRESSURE: 68 MMHG | WEIGHT: 190 LBS

## 2024-05-31 PROCEDURE — 7100000010 HC PHASE II RECOVERY - FIRST 15 MIN: Performed by: SURGERY

## 2024-05-31 PROCEDURE — 3600000012 HC SURGERY LEVEL 2 ADDTL 15MIN: Performed by: SURGERY

## 2024-05-31 PROCEDURE — 2500000003 HC RX 250 WO HCPCS: Performed by: SURGERY

## 2024-05-31 PROCEDURE — 6370000000 HC RX 637 (ALT 250 FOR IP): Performed by: SURGERY

## 2024-05-31 PROCEDURE — 3600000002 HC SURGERY LEVEL 2 BASE: Performed by: SURGERY

## 2024-05-31 PROCEDURE — 11401 EXC TR-EXT B9+MARG 0.6-1 CM: CPT | Performed by: SURGERY

## 2024-05-31 PROCEDURE — 2709999900 HC NON-CHARGEABLE SUPPLY: Performed by: SURGERY

## 2024-05-31 PROCEDURE — 6360000002 HC RX W HCPCS: Performed by: SURGERY

## 2024-05-31 RX ORDER — CEPHALEXIN 500 MG/1
500 CAPSULE ORAL 2 TIMES DAILY
Qty: 10 CAPSULE | Refills: 0 | Status: SHIPPED | OUTPATIENT
Start: 2024-05-31 | End: 2024-06-05

## 2024-05-31 RX ORDER — BUPIVACAINE HYDROCHLORIDE 2.5 MG/ML
INJECTION, SOLUTION EPIDURAL; INFILTRATION; INTRACAUDAL
Status: DISCONTINUED
Start: 2024-05-31 | End: 2024-05-31 | Stop reason: HOSPADM

## 2024-05-31 RX ORDER — ACETAMINOPHEN 160 MG
TABLET,DISINTEGRATING ORAL PRN
Status: DISCONTINUED | OUTPATIENT
Start: 2024-05-31 | End: 2024-05-31 | Stop reason: ALTCHOICE

## 2024-05-31 RX ORDER — LIDOCAINE HYDROCHLORIDE 10 MG/ML
INJECTION, SOLUTION INFILTRATION; PERINEURAL
Status: DISCONTINUED
Start: 2024-05-31 | End: 2024-05-31 | Stop reason: HOSPADM

## 2024-05-31 RX ORDER — GINSENG 100 MG
CAPSULE ORAL
Status: DISCONTINUED
Start: 2024-05-31 | End: 2024-05-31 | Stop reason: HOSPADM

## 2024-05-31 ASSESSMENT — PAIN - FUNCTIONAL ASSESSMENT
PAIN_FUNCTIONAL_ASSESSMENT: 0-10
PAIN_FUNCTIONAL_ASSESSMENT: NONE - DENIES PAIN

## 2024-05-31 NOTE — H&P
[facility] General Surgery  [facility]      Patient's Name/ Date of Birth/ Gender: Osman Madrid / 1955 (69 y.o.) / male     Attending physician: Rosales Richmond DO    CC: Left chest subcutaneous mass    History of present Illness: Osman Madrid is a 69 y.o. male, presents for mass excision.     Past Medical History:  has a past medical history of COPD (chronic obstructive pulmonary disease) (HCC), Diabetes mellitus (HCC), and Hypertension.    Past Surgical History:   Past Surgical History:   Procedure Laterality Date    COLONOSCOPY  11/29/2021    COLONOSCOPY DIAGNOSTIC    COLONOSCOPY N/A 11/29/2021    COLONOSCOPY SIGMOID COLON POLYPECTOMIES SNARE/RANDOM COLON BIOPSIES performed by Violet Luther MD at [facility] OR       Social History:  reports that he has never smoked. He has never used smokeless tobacco. He reports current alcohol use of about 1.0 standard drink of alcohol per week. He reports that he does not use drugs.    Family History: family history includes Colon Cancer (age of onset: 74) in his brother; Colon Cancer (age of onset: 80) in his father; No Known Problems in his mother; Suicide in his brother.    Review of Systems:   General: Denies fever, chills, night sweats, weight loss, malaise, fatigue  HEENT: Denies sore throat, sinus problems, allergic rhinosinusitis  Card: Denies chest pain, palpitations, orthopnea/PND. Denies h/o murmurs  Pulm: Denies cough, shortness of breath, CARPENTER  GI:   Denies history of constipation, diarrhea, hematochezia or melena  : Denies polyuria, dysuria, hematuria  Endo: Denies diabetes, thyroid problems.  Heme: Denies anemia, h/o bleeding or clotting problems.   Neuro: Denies h/o CVA, TIA  Skin: Denies rashes, ulcers  Musculoskeletal: Denies muscle, joint, back pain.    Allergies: Patient has no known allergies.    Current Meds:  Current Facility-Administered Medications:     lidocaine 1 % injection, , , ,     BUPivacaine (PF) (MARCAINE)

## 2024-05-31 NOTE — DISCHARGE INSTRUCTIONS
Patient Discharge Instructions  Discharge Date:  5/31/2024    HYGEINE: Ok to shower 06/01/24, no soaking in a tub/pool until seen at your follow up appointment. Clean incision daily with gentle soap and water, do not use alcohol/peroxide or any harsh cleansers.    DRIVING: No driving while taking narcotic medications or while in pain    ACTIVITY: No strain to incision.      DIET: Resume your normal diet as advised by your PCP.    MEDICATIONS: Take all medications as prescribed. Take Colace until you have your first bowel movement, continue to take if you are using narcotics for pain control    SPECIAL INSTRUCTIONS:     Follow up with Dr. Richmond in 10-14 days, call the clinic for appointment. Call sooner if fever above 100.4 degrees Farenheit, increase in swelling or redness, thick purulent discharge or pain not controlled with medications.

## 2024-05-31 NOTE — OP NOTE
Operative Note      Patient: Osman Madrid  YOB: 1955  MRN: [id number]    Date of Procedure: 5/31/2024    Pre-Op Diagnosis Codes:     * Epidermoid cyst of skin of chest [L72.0]    Post-Op Diagnosis:   same       Procedure(s):  LEFT CHEST SUBCUTANEOUS CYST EXCISION    Surgeon(s):  Rosales Richmond DO    Assistant:   * No surgical staff found *    Anesthesia: Local    Estimated Blood Loss (mL): Minimal    Complications: None    Specimens:   ID Type Source Tests Collected by Time Destination   1 :  Tissue Tissue  Rosales Richmond DO 5/31/2024 1031        Implants:  * No implants in log *      Drains: * No LDAs found *    Findings:  Infection Present At Time Of Surgery (PATOS) (choose all levels that have infection present):  No infection present  Other Findings: wound class 2      Detailed Description of Procedure:     HISTORY: The patient is a 69 y.o. year old male with history of above preop diagnosis.  The risk, benefits, expected outcome, and alternatives to the procedure were explained to the patient's understanding and written informed consent was obtained.     OPERATIVE DETAIL:  The patient was brought to the operating suite, was transferred to the operating table in supine position. Timeout was performed verifying correct patient, position, equipment and procedure to be performed.  EPC cuffs were applied.     The left chest was prepped and draped in the usual sterile fashion. Local analgesia with 1% lidocaine and 0.25% marcaine without epinephrine.    Incision was made with scalpel over the lesion, dissection carried into the subcutaneous tissue with cautery. Subcutaneous mass was encountered consistent with epidermoid cyst.    Total size 1cm.    Cyst contents were evacuated. Cyst sac was extremely thin and fragmented, the cavity was fully cauterized to prevent recurrence. Hemostasis was excellent. The cavity was irrigated with hydrogen peroxide. The incision closed with interrupted 2-0 nylon sutures.

## 2024-06-17 ENCOUNTER — OFFICE VISIT (OUTPATIENT)
Dept: SURGERY | Age: 69
End: 2024-06-17
Payer: MEDICARE

## 2024-06-17 VITALS
HEART RATE: 64 BPM | BODY MASS INDEX: 28.95 KG/M2 | OXYGEN SATURATION: 100 % | DIASTOLIC BLOOD PRESSURE: 74 MMHG | RESPIRATION RATE: 16 BRPM | WEIGHT: 191 LBS | HEIGHT: 68 IN | SYSTOLIC BLOOD PRESSURE: 152 MMHG

## 2024-06-17 DIAGNOSIS — L72.0 EPIDERMOID CYST OF SKIN OF CHEST: Primary | ICD-10-CM

## 2024-06-17 PROCEDURE — 3077F SYST BP >= 140 MM HG: CPT | Performed by: SURGERY

## 2024-06-17 PROCEDURE — G8417 CALC BMI ABV UP PARAM F/U: HCPCS | Performed by: SURGERY

## 2024-06-17 PROCEDURE — 1123F ACP DISCUSS/DSCN MKR DOCD: CPT | Performed by: SURGERY

## 2024-06-17 PROCEDURE — 3017F COLORECTAL CA SCREEN DOC REV: CPT | Performed by: SURGERY

## 2024-06-17 PROCEDURE — 3078F DIAST BP <80 MM HG: CPT | Performed by: SURGERY

## 2024-06-17 PROCEDURE — 99213 OFFICE O/P EST LOW 20 MIN: CPT | Performed by: SURGERY

## 2024-06-17 PROCEDURE — G8427 DOCREV CUR MEDS BY ELIG CLIN: HCPCS | Performed by: SURGERY

## 2024-06-17 PROCEDURE — 1036F TOBACCO NON-USER: CPT | Performed by: SURGERY

## 2024-06-17 NOTE — PROGRESS NOTES
[facility] General Surgery   Clinic Evaluation  Rosales Richmond DO    PATIENT NAME: Osman Madrid     CLINIC VISIT DATE: 6/17/2024    SUBJECTIVE:  Osman Madrid is a 69 y.o. male who presents to the clinic today for follow-up to excision of epidermoid cyst of left chest, no new issues.       OBJECTIVE:    BP (!) 152/74 (Site: Right Upper Arm, Position: Sitting, Cuff Size: Small Adult)   Pulse 64   Resp 16   Ht 1.727 m (5' 8\")   Wt 86.6 kg (191 lb)   SpO2 100%   BMI 29.04 kg/m²     General Appearance:  awake, alert, no acute distress, well developed, well nourished   Skin: Excision site healed with sutures intact, no evidence of bleeding or infection  Lungs:  Normal chest expansion, unlabored breathing without accessory muscle use.   No audible rales, rhonchi, or wheezing.  Cardiovascular: S1S2. No evidence of JVD. No evidence of pulsatile masses in abdomen  Abdomen:  Soft, non-tender, no organomegaly, no masses.  Musculoskeletal: No evidence of bony/muscular deformities, trauma, atrophy of either left/right upper/lower extremity. No evidence of digital clubbing or cyanosis.      ASSESSMENT:  1. Epidermoid cyst of skin of chest            PLAN:  Sutures removed without issue  Local hygiene with soap and water  Follow-up as needed    Electronically signed by Rosales Richmond DO on 6/17/2024 at 1:53 PM

## 2024-08-12 ENCOUNTER — OFFICE VISIT (OUTPATIENT)
Dept: PRIMARY CARE CLINIC | Age: 69
End: 2024-08-12
Payer: MEDICARE

## 2024-08-12 VITALS
SYSTOLIC BLOOD PRESSURE: 122 MMHG | HEART RATE: 74 BPM | OXYGEN SATURATION: 97 % | WEIGHT: 193 LBS | BODY MASS INDEX: 29.25 KG/M2 | HEIGHT: 68 IN | DIASTOLIC BLOOD PRESSURE: 64 MMHG

## 2024-08-12 DIAGNOSIS — L72.0 EPIDERMOID CYST: ICD-10-CM

## 2024-08-12 DIAGNOSIS — I10 ESSENTIAL HYPERTENSION: ICD-10-CM

## 2024-08-12 DIAGNOSIS — E11.65 TYPE 2 DIABETES MELLITUS WITH HYPERGLYCEMIA, WITHOUT LONG-TERM CURRENT USE OF INSULIN (HCC): Primary | ICD-10-CM

## 2024-08-12 LAB — HBA1C MFR BLD: 8.9 %

## 2024-08-12 PROCEDURE — 3017F COLORECTAL CA SCREEN DOC REV: CPT | Performed by: STUDENT IN AN ORGANIZED HEALTH CARE EDUCATION/TRAINING PROGRAM

## 2024-08-12 PROCEDURE — 99214 OFFICE O/P EST MOD 30 MIN: CPT | Performed by: STUDENT IN AN ORGANIZED HEALTH CARE EDUCATION/TRAINING PROGRAM

## 2024-08-12 PROCEDURE — 1036F TOBACCO NON-USER: CPT | Performed by: STUDENT IN AN ORGANIZED HEALTH CARE EDUCATION/TRAINING PROGRAM

## 2024-08-12 PROCEDURE — 83036 HEMOGLOBIN GLYCOSYLATED A1C: CPT | Performed by: STUDENT IN AN ORGANIZED HEALTH CARE EDUCATION/TRAINING PROGRAM

## 2024-08-12 PROCEDURE — G8417 CALC BMI ABV UP PARAM F/U: HCPCS | Performed by: STUDENT IN AN ORGANIZED HEALTH CARE EDUCATION/TRAINING PROGRAM

## 2024-08-12 PROCEDURE — 2022F DILAT RTA XM EVC RTNOPTHY: CPT | Performed by: STUDENT IN AN ORGANIZED HEALTH CARE EDUCATION/TRAINING PROGRAM

## 2024-08-12 PROCEDURE — 3052F HG A1C>EQUAL 8.0%<EQUAL 9.0%: CPT | Performed by: STUDENT IN AN ORGANIZED HEALTH CARE EDUCATION/TRAINING PROGRAM

## 2024-08-12 PROCEDURE — 3074F SYST BP LT 130 MM HG: CPT | Performed by: STUDENT IN AN ORGANIZED HEALTH CARE EDUCATION/TRAINING PROGRAM

## 2024-08-12 PROCEDURE — 1123F ACP DISCUSS/DSCN MKR DOCD: CPT | Performed by: STUDENT IN AN ORGANIZED HEALTH CARE EDUCATION/TRAINING PROGRAM

## 2024-08-12 PROCEDURE — 3078F DIAST BP <80 MM HG: CPT | Performed by: STUDENT IN AN ORGANIZED HEALTH CARE EDUCATION/TRAINING PROGRAM

## 2024-08-12 PROCEDURE — G8427 DOCREV CUR MEDS BY ELIG CLIN: HCPCS | Performed by: STUDENT IN AN ORGANIZED HEALTH CARE EDUCATION/TRAINING PROGRAM

## 2024-08-12 RX ORDER — CLOPIDOGREL BISULFATE 75 MG/1
75 TABLET ORAL EVERY MORNING
COMMUNITY
Start: 2024-07-11

## 2024-08-12 RX ORDER — GLIPIZIDE 5 MG/1
5 TABLET, FILM COATED, EXTENDED RELEASE ORAL DAILY
Qty: 90 TABLET | Refills: 1 | Status: SHIPPED | OUTPATIENT
Start: 2024-08-12 | End: 2025-02-08

## 2024-08-12 NOTE — ASSESSMENT & PLAN NOTE
A1C is 8.9 in office today. Currently taking metformin 1000 mg BID and Jardiance 25 mg QD. Fasting blood glucose ranging from 180-250. Patient's diabetes is suboptimally controlled due to interruption of Ozempic. Patient with history of coronary artery disease, would benefit from taking a GLP-1 agonist. Paperwork was completed in office for patient assistance for Ozempic. Will add glipizide XL 5 mg QD. He will contact the office in 1 week to report changes in his fasting blood glucose. Encouraged patient to maintain a consistent meal schedule to avoid developing hypoglycemia on this medication.

## 2024-08-12 NOTE — PROGRESS NOTES
24, without complication.     Patient reports his son  by suicide on 24 following a long history of drug abuse. He denies personal psychiatric concerns, but notes increased stress related to his family grieving.     REVIEW OF SYSTEMS:     Review of Systems   Constitutional:  Negative for chills, fatigue and fever.   Respiratory:  Negative for cough, shortness of breath and wheezing.    Cardiovascular:  Negative for chest pain, palpitations and leg swelling.   Gastrointestinal:  Negative for abdominal pain, blood in stool, constipation, diarrhea, nausea and vomiting.   Neurological:  Negative for dizziness, weakness, light-headedness, numbness and headaches.        REVIEWED INFORMATION:      Current Outpatient Medications on File Prior to Visit   Medication Sig Dispense Refill    clopidogrel (PLAVIX) 75 MG tablet Take 1 tablet by mouth every morning      empagliflozin (JARDIANCE) 25 MG tablet Take 1 tablet by mouth daily 90 tablet 3    metFORMIN (GLUCOPHAGE-XR) 500 MG extended release tablet Take 2 tablets by mouth with breakfast and 2 with supper 360 tablet 3    lisinopril-hydroCHLOROthiazide (PRINZIDE;ZESTORETIC) 20-25 MG per tablet Take 1 tablet by mouth daily 90 tablet 1    albuterol sulfate HFA (PROVENTIL HFA) 108 (90 Base) MCG/ACT inhaler Inhale 2 puffs into the lungs every 6 hours as needed for Wheezing 18 g 3    lisinopril (PRINIVIL;ZESTRIL) 10 MG tablet Take 1 tablet by mouth daily 90 tablet 3    albuterol (PROVENTIL) (2.5 MG/3ML) 0.083% nebulizer solution Take 3 mLs by nebulization every 6 hours as needed for Wheezing or Shortness of Breath 120 each 1    ipratropium (ATROVENT) 0.02 % nebulizer solution Take 2.5 mLs by nebulization every 4 hours as needed for Wheezing 90 mL 1    Semaglutide, 1 MG/DOSE, 2 MG/1.5ML SOPN Inject 1 mg into the skin once a week Patient receives through [employer] Patient assistance Program      atorvastatin (LIPITOR) 20 MG tablet Take 1 tablet by mouth every  no

## 2024-08-12 NOTE — PATIENT INSTRUCTIONS
Please continue to check your blood glucose once daily in the morning fasting.    Please send me a [employer] message in 2 weeks with your BG numbers.

## 2024-08-12 NOTE — ASSESSMENT & PLAN NOTE
BP is 122/64 in office today. He does not check his blood pressure regularly at home. Currently taking amlodipine 10 mg QD, carvedilol 6.25 mg BID, lisinopril 10 mg QD and lisinopril-HCTZ 20-25 mg QD. Continue current medications as prescribed.

## 2024-08-12 NOTE — ASSESSMENT & PLAN NOTE
Patient was evaluated by Dr. Richmond on 5/13/24 for a soft tissue mass on his chest. He underwent excision under sedation on 5/31/24, without complication. Resolved.

## 2024-09-16 ENCOUNTER — PATIENT MESSAGE (OUTPATIENT)
Dept: PRIMARY CARE CLINIC | Age: 69
End: 2024-09-16

## 2024-10-03 ENCOUNTER — PATIENT MESSAGE (OUTPATIENT)
Dept: PRIMARY CARE CLINIC | Age: 69
End: 2024-10-03

## 2024-10-03 DIAGNOSIS — E11.65 TYPE 2 DIABETES MELLITUS WITH HYPERGLYCEMIA, WITHOUT LONG-TERM CURRENT USE OF INSULIN (HCC): ICD-10-CM

## 2024-10-09 RX ORDER — GLIPIZIDE 5 MG/1
TABLET, FILM COATED, EXTENDED RELEASE ORAL
Qty: 180 TABLET | Refills: 1 | Status: SHIPPED | OUTPATIENT
Start: 2024-10-09 | End: 2024-10-15 | Stop reason: SDUPTHER

## 2024-10-09 NOTE — TELEPHONE ENCOUNTER
Please have patient increase glipizide to 10 mg in the morning with breakfast, 5 mg with dinner.  Monitor for hyoglycemia and call with any issues.  I would like to see BG numbers in 1-2 weeks.  If patient has insufficient supply of 5 mg tablets to last this long, let me know and well send a new Rx.  Thanks.

## 2024-10-16 RX ORDER — GLIPIZIDE 5 MG/1
TABLET, FILM COATED, EXTENDED RELEASE ORAL
Qty: 180 TABLET | Refills: 1 | Status: SHIPPED | OUTPATIENT
Start: 2024-10-16

## 2024-11-12 ENCOUNTER — HOSPITAL ENCOUNTER (OUTPATIENT)
Age: 69
Setting detail: SPECIMEN
Discharge: HOME | End: 2024-11-12

## 2024-11-12 ENCOUNTER — OFFICE VISIT (OUTPATIENT)
Dept: PRIMARY CARE CLINIC | Age: 69
End: 2024-11-12

## 2024-11-12 VITALS
SYSTOLIC BLOOD PRESSURE: 110 MMHG | WEIGHT: 204 LBS | OXYGEN SATURATION: 97 % | HEIGHT: 68 IN | BODY MASS INDEX: 30.92 KG/M2 | HEART RATE: 69 BPM | DIASTOLIC BLOOD PRESSURE: 56 MMHG

## 2024-11-12 DIAGNOSIS — E11.65 TYPE 2 DIABETES MELLITUS WITH HYPERGLYCEMIA, WITHOUT LONG-TERM CURRENT USE OF INSULIN (HCC): Primary | ICD-10-CM

## 2024-11-12 DIAGNOSIS — R35.0 BENIGN PROSTATIC HYPERPLASIA WITH URINARY FREQUENCY: ICD-10-CM

## 2024-11-12 DIAGNOSIS — I10 ESSENTIAL HYPERTENSION: ICD-10-CM

## 2024-11-12 DIAGNOSIS — E78.5 HYPERLIPIDEMIA ASSOCIATED WITH TYPE 2 DIABETES MELLITUS (HCC): ICD-10-CM

## 2024-11-12 DIAGNOSIS — Z12.5 ENCOUNTER FOR SCREENING FOR MALIGNANT NEOPLASM OF PROSTATE: ICD-10-CM

## 2024-11-12 DIAGNOSIS — Z12.11 ENCOUNTER FOR SCREENING FOR MALIGNANT NEOPLASM OF COLON: ICD-10-CM

## 2024-11-12 DIAGNOSIS — E11.69 HYPERLIPIDEMIA ASSOCIATED WITH TYPE 2 DIABETES MELLITUS (HCC): ICD-10-CM

## 2024-11-12 DIAGNOSIS — E11.65 TYPE 2 DIABETES MELLITUS WITH HYPERGLYCEMIA, WITHOUT LONG-TERM CURRENT USE OF INSULIN (HCC): ICD-10-CM

## 2024-11-12 DIAGNOSIS — D12.6 TUBULAR ADENOMA OF COLON: ICD-10-CM

## 2024-11-12 DIAGNOSIS — I25.10 ATHEROSCLEROSIS OF NATIVE CORONARY ARTERY OF NATIVE HEART WITHOUT ANGINA PECTORIS: ICD-10-CM

## 2024-11-12 DIAGNOSIS — Z11.59 ENCOUNTER FOR HEPATITIS C SCREENING TEST FOR LOW RISK PATIENT: ICD-10-CM

## 2024-11-12 DIAGNOSIS — N40.1 BENIGN PROSTATIC HYPERPLASIA WITH URINARY FREQUENCY: ICD-10-CM

## 2024-11-12 PROBLEM — L72.0 EPIDERMOID CYST: Status: RESOLVED | Noted: 2024-08-12 | Resolved: 2024-11-12

## 2024-11-12 PROBLEM — M79.89 MASS OF SOFT TISSUE OF CHEST: Status: RESOLVED | Noted: 2024-05-03 | Resolved: 2024-11-12

## 2024-11-12 LAB
ALBUMIN SERPL-MCNC: 4.9 G/DL (ref 3.5–5.2)
ALBUMIN/GLOB SERPL: 1.5 {RATIO} (ref 1–2.5)
ALP SERPL-CCNC: 116 U/L (ref 40–129)
ALT SERPL-CCNC: 16 U/L (ref 10–50)
ANION GAP SERPL CALCULATED.3IONS-SCNC: 12 MMOL/L (ref 9–16)
AST SERPL-CCNC: 14 U/L (ref 10–50)
BASOPHILS # BLD: 0.06 K/UL (ref 0–0.2)
BASOPHILS NFR BLD: 1 % (ref 0–2)
BILIRUB SERPL-MCNC: 0.7 MG/DL (ref 0–1.2)
BUN SERPL-MCNC: 21 MG/DL (ref 8–23)
CALCIUM SERPL-MCNC: 9.8 MG/DL (ref 8.6–10.4)
CHLORIDE SERPL-SCNC: 103 MMOL/L (ref 98–107)
CHOLEST SERPL-MCNC: 143 MG/DL (ref 0–199)
CHOLESTEROL/HDL RATIO: 3.6
CO2 SERPL-SCNC: 29 MMOL/L (ref 20–31)
CREAT SERPL-MCNC: 0.8 MG/DL (ref 0.7–1.2)
CREAT UR-MCNC: 29.7 MG/DL (ref 39–259)
EOSINOPHIL # BLD: 0.1 K/UL (ref 0–0.44)
EOSINOPHILS RELATIVE PERCENT: 1 % (ref 1–4)
ERYTHROCYTE [DISTWIDTH] IN BLOOD BY AUTOMATED COUNT: 13.7 % (ref 11.8–14.4)
GFR, ESTIMATED: >90 ML/MIN/1.73M2
GLUCOSE SERPL-MCNC: 166 MG/DL (ref 74–99)
HBA1C MFR BLD: 7.1 %
HCT VFR BLD AUTO: 55.5 % (ref 40.7–50.3)
HCV AB SERPL QL IA: REACTIVE
HDLC SERPL-MCNC: 40 MG/DL
HGB BLD-MCNC: 18.1 G/DL (ref 13–17)
IMM GRANULOCYTES # BLD AUTO: <0.03 K/UL (ref 0–0.3)
IMM GRANULOCYTES NFR BLD: 0 %
LDLC SERPL CALC-MCNC: 48 MG/DL (ref 0–100)
LYMPHOCYTES NFR BLD: 1.94 K/UL (ref 1.1–3.7)
LYMPHOCYTES RELATIVE PERCENT: 21 % (ref 24–43)
MCH RBC QN AUTO: 29.7 PG (ref 25.2–33.5)
MCHC RBC AUTO-ENTMCNC: 32.6 G/DL (ref 28.4–34.8)
MCV RBC AUTO: 91.1 FL (ref 82.6–102.9)
MICROALBUMIN UR-MCNC: <12 MG/L (ref 0–20)
MICROALBUMIN/CREAT UR-RTO: ABNORMAL MCG/MG CREAT (ref 0–17)
MONOCYTES NFR BLD: 0.6 K/UL (ref 0.1–1.2)
MONOCYTES NFR BLD: 7 % (ref 3–12)
NEUTROPHILS NFR BLD: 70 % (ref 36–65)
NEUTS SEG NFR BLD: 6.42 K/UL (ref 1.5–8.1)
NRBC BLD-RTO: 0 PER 100 WBC
PLATELET # BLD AUTO: 218 K/UL (ref 138–453)
PMV BLD AUTO: 9.6 FL (ref 8.1–13.5)
POTASSIUM SERPL-SCNC: 4.5 MMOL/L (ref 3.7–5.3)
PROT SERPL-MCNC: 8.1 G/DL (ref 6.6–8.7)
PSA SERPL-MCNC: 0.8 NG/ML (ref 0–4)
RBC # BLD AUTO: 6.09 M/UL (ref 4.21–5.77)
SODIUM SERPL-SCNC: 144 MMOL/L (ref 136–145)
TRIGL SERPL-MCNC: 276 MG/DL
VLDLC SERPL CALC-MCNC: 55 MG/DL (ref 1–30)
WBC OTHER # BLD: 9.1 K/UL (ref 3.5–11.3)

## 2024-11-12 RX ORDER — TAMSULOSIN HYDROCHLORIDE 0.4 MG/1
0.4 CAPSULE ORAL DAILY
Qty: 30 CAPSULE | Refills: 2 | Status: SHIPPED | OUTPATIENT
Start: 2024-11-12

## 2024-11-12 ASSESSMENT — ENCOUNTER SYMPTOMS
COUGH: 0
BLOOD IN STOOL: 0
DIARRHEA: 1
CONSTIPATION: 0
ABDOMINAL PAIN: 0
SHORTNESS OF BREATH: 0
WHEEZING: 0
NAUSEA: 0
VOMITING: 0

## 2024-11-12 NOTE — PROGRESS NOTES
[facility]  [facility] PRIMARY CARE  [address] [place] [id number]  Dept: [phone]       Date of Visit:  2024  Patient Name: Osman Madrid   Patient :  1955     CHIEF COMPLAINT:     Osman Madrid is a 69 y.o. male who presents today to be evaluated for the following condition(s):  Chief Complaint   Patient presents with    Diabetes     Pt is taking Jardiance, Glipizide, Metformin. Pt does check his BG at home, last reading was 151. Pt's last hgb A1c was 8.9 on 24. He is due for DM retinal and foot exams - no eye doctor.     Hypertension     Pt is taking Amlodipine, Lisinopril, Carvedilol, and Lisinopril-HCTZ as prescribed. No concerns. Pt does not check his BP at home. Pt denies chest pain, SOB, HA, dizziness, LE edema.     Other     HM - colonoscopy, AWV, tdap, flu and prevnar - declined both       HISTORY OF PRESENT ILLNESS:      HPI   Patient is presenting today for follow up.    T2DM  Patient reports compliance with his Jardiance, metformin, glipizide which he tolerates well without adverse effects.  HbgA1c today improved to 7.1 from 8.9 previously since starting and titrating glipizide.  He is due for diabetic eye exam; not currently established with a provider.  No numbness/tingling of hands, feet; no issues with claudication or poor wound healing.    HTN/HLD/CAD  Blood pressure is excellently controlled with amlodipine, lisinopril-HCTZ, carvedilol; patient is asymptomatic.  Prior h/o CAD s/p stent placement to LAD and circumflex; last seen by Cardiology in 2024, office note reviewed.  Last LDL 39 in 2024.    Patient has been experiencing some lower urinary symptoms; specifically has noted weak urinary stream, nocturia x3, increased urgency and frequency over past several months.  No prior PSA.  No dysuria, hematuria.  Will occasionally experience some mild suprapubic/lower abdominal discomfort.  AUA symptom score completed today with score of 19;

## 2024-11-12 NOTE — ASSESSMENT & PLAN NOTE
New problem; poorly controlled; will update PSA and start tamsulosin; discussed common adverse effects; advised to be careful with positional changes; advised to cut off fluids at least 2 hours prior to bedtime.    Orders:    tamsulosin (FLOMAX) 0.4 MG capsule; Take 1 capsule by mouth daily

## 2024-11-12 NOTE — ASSESSMENT & PLAN NOTE
Well controlled; continue atorvastatin, plavix.    Orders:    CBC with Auto Differential; Future    Comprehensive Metabolic Panel; Future    Lipid Panel; Future

## 2024-11-12 NOTE — ASSESSMENT & PLAN NOTE
Well controlled; continue Jardiance, metformin, glipizide at present dosing.  Referral placed for eye exam.  Diabetic foot care discussed.    Orders:    POCT glycosylated hemoglobin (Hb A1C)    CBC with Auto Differential; Future    Comprehensive Metabolic Panel; Future    Lipid Panel; Future    Microalbumin, Ur; Future    HM DIABETES FOOT EXAM    External Referral To Optometry

## 2024-11-12 NOTE — ASSESSMENT & PLAN NOTE
Well controlled; continue lisinopril-HCTZ, amlodipine, carvedilol at present dosing.    Orders:    CBC with Auto Differential; Future    Comprehensive Metabolic Panel; Future    Lipid Panel; Future    Microalbumin, Ur; Future

## 2024-11-12 NOTE — ASSESSMENT & PLAN NOTE
As above.    Orders:    CBC with Auto Differential; Future    Comprehensive Metabolic Panel; Future    Lipid Panel; Future

## 2024-11-13 ENCOUNTER — TELEPHONE (OUTPATIENT)
Dept: GASTROENTEROLOGY | Age: 69
End: 2024-11-13

## 2024-12-02 ENCOUNTER — OFFICE VISIT (OUTPATIENT)
Dept: GASTROENTEROLOGY | Age: 69
End: 2024-12-02

## 2024-12-02 ENCOUNTER — TELEPHONE (OUTPATIENT)
Dept: GASTROENTEROLOGY | Age: 69
End: 2024-12-02

## 2024-12-02 VITALS
SYSTOLIC BLOOD PRESSURE: 153 MMHG | WEIGHT: 211 LBS | OXYGEN SATURATION: 94 % | DIASTOLIC BLOOD PRESSURE: 80 MMHG | BODY MASS INDEX: 32.08 KG/M2 | RESPIRATION RATE: 16 BRPM | HEART RATE: 72 BPM | TEMPERATURE: 97.4 F

## 2024-12-02 DIAGNOSIS — D12.6 TUBULAR ADENOMA OF COLON: Primary | ICD-10-CM

## 2024-12-02 DIAGNOSIS — K57.30 DIVERTICULOSIS LARGE INTESTINE W/O PERFORATION OR ABSCESS W/O BLEEDING: ICD-10-CM

## 2024-12-02 DIAGNOSIS — Z95.5 H/O HEART ARTERY STENT: ICD-10-CM

## 2024-12-02 DIAGNOSIS — Z90.49 HISTORY OF PARTIAL COLECTOMY: ICD-10-CM

## 2024-12-02 DIAGNOSIS — R19.7 DIARRHEA, UNSPECIFIED TYPE: ICD-10-CM

## 2024-12-02 ASSESSMENT — ENCOUNTER SYMPTOMS
ABDOMINAL PAIN: 0
COUGH: 0
SORE THROAT: 0
TROUBLE SWALLOWING: 0
BLOOD IN STOOL: 0
ABDOMINAL DISTENTION: 0
WHEEZING: 0
VOMITING: 0
CHOKING: 0
CONSTIPATION: 0
NAUSEA: 0
VOICE CHANGE: 0
RECTAL PAIN: 0
ANAL BLEEDING: 0
DIARRHEA: 1

## 2024-12-02 NOTE — PROGRESS NOTES
11/12/2024    MPV 9.6 11/12/2024    BASOPCT 1 11/12/2024    LYMPHSABS 1.94 11/12/2024    MONOSABS 0.60 11/12/2024    NEUTROABS 6.42 11/12/2024    EOSABS 0.10 11/12/2024    BASOSABS 0.06 11/12/2024         DIAGNOSTIC TESTING:     No results found.       PHYSICAL EXAMINATION: Vital signs reviewed per the nursing documentation.     BP (!) 153/80   Pulse 72   Temp 97.4 °F (36.3 °C) (Tympanic)   Resp 16   Wt 95.7 kg (211 lb)   SpO2 94%   BMI 32.08 kg/m²   Body mass index is 32.08 kg/m².   Physical Exam  Constitutional:       General: He is not in acute distress.     Appearance: He is not ill-appearing.   HENT:      Head: Normocephalic.      Mouth/Throat:      Mouth: Mucous membranes are moist. No oral lesions.   Eyes:      General: No scleral icterus.  Cardiovascular:      Rate and Rhythm: Normal rate and regular rhythm.      Heart sounds: No murmur heard.  Pulmonary:      Effort: Pulmonary effort is normal.      Breath sounds: Normal breath sounds.   Abdominal:      General: Bowel sounds are normal. There is no distension.      Palpations: Abdomen is soft. There is no hepatomegaly, splenomegaly or mass.      Tenderness: There is no abdominal tenderness. There is no guarding.   Musculoskeletal:      Right lower leg: No edema.      Left lower leg: No edema.   Lymphadenopathy:      Cervical: No cervical adenopathy.      Upper Body:      Right upper body: No supraclavicular adenopathy.   Skin:     General: Skin is warm and dry.      Coloration: Skin is not jaundiced.      Findings: No bruising or rash.   Neurological:      General: No focal deficit present.      Mental Status: He is alert and oriented to person, place, and time. Mental status is at baseline.   Psychiatric:         Mood and Affect: Mood is not anxious.         Behavior: Behavior is cooperative.         Cognition and Memory: Cognition is not impaired. Memory is not impaired.           IMPRESSION: Mr. Madrid is a 69 y.o. male with    Diagnosis Orders

## 2024-12-02 NOTE — TELEPHONE ENCOUNTER
Patient has been seen in office today with edna, patient has been recommended to have colonoscopy, patient would like to be scheduled in [facility].   BT: No  CKD: No  Const: No  Pharm: [facility]

## 2024-12-11 DIAGNOSIS — R76.8 POSITIVE HEPATITIS C ANTIBODY TEST: Primary | ICD-10-CM

## 2024-12-18 ENCOUNTER — PREP FOR PROCEDURE (OUTPATIENT)
Dept: GASTROENTEROLOGY | Age: 69
End: 2024-12-18

## 2024-12-18 DIAGNOSIS — Z12.11 ENCOUNTER FOR SCREENING COLONOSCOPY: ICD-10-CM

## 2024-12-20 ENCOUNTER — HOSPITAL ENCOUNTER (OUTPATIENT)
Age: 69
Setting detail: SPECIMEN
Discharge: HOME | End: 2024-12-20

## 2024-12-20 ENCOUNTER — OFFICE VISIT (OUTPATIENT)
Dept: PRIMARY CARE CLINIC | Age: 69
End: 2024-12-20

## 2024-12-20 VITALS
OXYGEN SATURATION: 98 % | HEIGHT: 68 IN | DIASTOLIC BLOOD PRESSURE: 68 MMHG | HEART RATE: 66 BPM | SYSTOLIC BLOOD PRESSURE: 130 MMHG | WEIGHT: 205 LBS | BODY MASS INDEX: 31.07 KG/M2

## 2024-12-20 DIAGNOSIS — R76.8 POSITIVE HEPATITIS C ANTIBODY TEST: ICD-10-CM

## 2024-12-20 DIAGNOSIS — R76.8 HEPATITIS C ANTIBODY TEST POSITIVE: ICD-10-CM

## 2024-12-20 DIAGNOSIS — Z00.00 MEDICARE ANNUAL WELLNESS VISIT, SUBSEQUENT: Primary | ICD-10-CM

## 2024-12-20 DIAGNOSIS — D75.1 SECONDARY POLYCYTHEMIA: ICD-10-CM

## 2024-12-20 DIAGNOSIS — R05.2 SUBACUTE COUGH: ICD-10-CM

## 2024-12-20 DIAGNOSIS — G47.33 OBSTRUCTIVE SLEEP APNEA SYNDROME: ICD-10-CM

## 2024-12-20 PROBLEM — Z11.59 ENCOUNTER FOR HEPATITIS C SCREENING TEST FOR LOW RISK PATIENT: Status: ACTIVE | Noted: 2024-12-20

## 2024-12-20 RX ORDER — FLUTICASONE PROPIONATE 50 MCG
1 SPRAY, SUSPENSION (ML) NASAL DAILY
Qty: 16 G | Refills: 2 | Status: SHIPPED | OUTPATIENT
Start: 2024-12-20

## 2024-12-20 ASSESSMENT — PATIENT HEALTH QUESTIONNAIRE - PHQ9
SUM OF ALL RESPONSES TO PHQ QUESTIONS 1-9: 0
1. LITTLE INTEREST OR PLEASURE IN DOING THINGS: NOT AT ALL
2. FEELING DOWN, DEPRESSED OR HOPELESS: NOT AT ALL
SUM OF ALL RESPONSES TO PHQ QUESTIONS 1-9: 0
SUM OF ALL RESPONSES TO PHQ9 QUESTIONS 1 & 2: 0

## 2024-12-20 NOTE — PATIENT INSTRUCTIONS

## 2024-12-20 NOTE — PROGRESS NOTES
Medicare Annual Wellness Visit    Osman Madrid is here for Medicare AWV (Hep C concerns. Pt was unaware of the lab order that was placed on 12/11/24 - he will get this done before leaving today. )    Assessment & Plan  1. Hepatitis C.  The patient's recent laboratory results indicate a reactive hepatitis C antibody, suggesting past exposure to the virus. However, his liver enzymes are within normal limits, indicating no active liver inflammation. A confirmatory blood test will be conducted to detect the presence of the virus's genetic material in his blood. If the test returns positive, further follow-up labs or testing may be required to assess his liver condition.    2. Sleep apnea.  His hemoglobin levels are slightly elevated, likely due to his non-use of CPAP for sleep apnea. This suggests that his oxygen levels may be dropping significantly during sleep. Previous testing for elevated hemoglobin ruled out polycythemia vera. A nocturnal oxygen study could be considered. The Inspire system, an implantable device for obstructive sleep apnea, was discussed as a potential treatment option. A referral to Dr. Smith, a sleep specialist, will be made for further evaluation and discussion of potential treatment options.    3. Diabetes mellitus.  His A1c level has decreased from 8.9 to 7.1, indicating improved control of his diabetes. He has been without Jardiance for a month due to prescription issues but has now resumed the medication. He is advised to engage in regular physical activity, such as brisk walking for 10 to 15 minutes a few times a week, and to incorporate resistance training exercises to maintain muscle mass and bone strength.    4. Cough.  His cough may be due to postnasal drainage. He is advised to use over-the-counter Flonase, administering one spray in each nostril daily. A prescription for Flonase will be provided. He is instructed to contact the office if his cough worsens, if he needs to use

## 2024-12-22 LAB
HCV RNA # SERPL NAA+PROBE: NOT DETECTED {COPIES}/ML
SPECIMEN SOURCE: NORMAL

## 2025-01-07 DIAGNOSIS — E11.65 TYPE 2 DIABETES MELLITUS WITH HYPERGLYCEMIA, WITHOUT LONG-TERM CURRENT USE OF INSULIN (HCC): ICD-10-CM

## 2025-01-07 RX ORDER — METFORMIN HYDROCHLORIDE 500 MG/1
TABLET, EXTENDED RELEASE ORAL
Qty: 360 TABLET | Refills: 3 | Status: SHIPPED | OUTPATIENT
Start: 2025-01-07

## 2025-01-07 NOTE — TELEPHONE ENCOUNTER
Osman Madrid is requesting a refill on the following medication(s):  Requested Prescriptions     Pending Prescriptions Disp Refills    metFORMIN (GLUCOPHAGE-XR) 500 MG extended release tablet 360 tablet 3     Sig: Take 2 tablets by mouth with breakfast and 2 with supper       Last Visit Date (If Applicable):  12/20/2024    Next Visit Date:    3/20/2025   NEGATIVE

## 2025-01-17 PROBLEM — Z12.11 ENCOUNTER FOR SCREENING COLONOSCOPY: Status: RESOLVED | Noted: 2024-12-18 | Resolved: 2025-01-17

## 2025-01-19 PROBLEM — Z11.59 ENCOUNTER FOR HEPATITIS C SCREENING TEST FOR LOW RISK PATIENT: Status: RESOLVED | Noted: 2024-12-20 | Resolved: 2025-01-19

## 2025-02-21 DIAGNOSIS — R35.0 BENIGN PROSTATIC HYPERPLASIA WITH URINARY FREQUENCY: ICD-10-CM

## 2025-02-21 DIAGNOSIS — N40.1 BENIGN PROSTATIC HYPERPLASIA WITH URINARY FREQUENCY: ICD-10-CM

## 2025-02-21 RX ORDER — TAMSULOSIN HYDROCHLORIDE 0.4 MG/1
0.4 CAPSULE ORAL DAILY
Qty: 30 CAPSULE | Refills: 0 | Status: SHIPPED | OUTPATIENT
Start: 2025-02-21

## 2025-03-10 ENCOUNTER — OFFICE VISIT (OUTPATIENT)
Dept: GASTROENTEROLOGY | Age: 70
End: 2025-03-10
Payer: MEDICARE

## 2025-03-10 VITALS
DIASTOLIC BLOOD PRESSURE: 73 MMHG | TEMPERATURE: 97.2 F | OXYGEN SATURATION: 92 % | SYSTOLIC BLOOD PRESSURE: 143 MMHG | BODY MASS INDEX: 31.47 KG/M2 | WEIGHT: 207 LBS | RESPIRATION RATE: 16 BRPM | HEART RATE: 74 BPM

## 2025-03-10 DIAGNOSIS — K52.9 CHRONIC DIARRHEA: Primary | ICD-10-CM

## 2025-03-10 DIAGNOSIS — Z90.49 HISTORY OF PARTIAL COLECTOMY: ICD-10-CM

## 2025-03-10 DIAGNOSIS — D12.6 TUBULAR ADENOMA OF COLON: ICD-10-CM

## 2025-03-10 DIAGNOSIS — Z95.5 H/O HEART ARTERY STENT: ICD-10-CM

## 2025-03-10 DIAGNOSIS — Z12.11 ENCOUNTER FOR SCREENING COLONOSCOPY: ICD-10-CM

## 2025-03-10 PROCEDURE — 1160F RVW MEDS BY RX/DR IN RCRD: CPT | Performed by: PHYSICIAN ASSISTANT

## 2025-03-10 PROCEDURE — G8417 CALC BMI ABV UP PARAM F/U: HCPCS | Performed by: PHYSICIAN ASSISTANT

## 2025-03-10 PROCEDURE — 1126F AMNT PAIN NOTED NONE PRSNT: CPT | Performed by: PHYSICIAN ASSISTANT

## 2025-03-10 PROCEDURE — 3077F SYST BP >= 140 MM HG: CPT | Performed by: PHYSICIAN ASSISTANT

## 2025-03-10 PROCEDURE — G8427 DOCREV CUR MEDS BY ELIG CLIN: HCPCS | Performed by: PHYSICIAN ASSISTANT

## 2025-03-10 PROCEDURE — 1159F MED LIST DOCD IN RCRD: CPT | Performed by: PHYSICIAN ASSISTANT

## 2025-03-10 PROCEDURE — 1036F TOBACCO NON-USER: CPT | Performed by: PHYSICIAN ASSISTANT

## 2025-03-10 PROCEDURE — 99213 OFFICE O/P EST LOW 20 MIN: CPT | Performed by: PHYSICIAN ASSISTANT

## 2025-03-10 PROCEDURE — 3017F COLORECTAL CA SCREEN DOC REV: CPT | Performed by: PHYSICIAN ASSISTANT

## 2025-03-10 PROCEDURE — 1123F ACP DISCUSS/DSCN MKR DOCD: CPT | Performed by: PHYSICIAN ASSISTANT

## 2025-03-10 PROCEDURE — 3078F DIAST BP <80 MM HG: CPT | Performed by: PHYSICIAN ASSISTANT

## 2025-03-10 RX ORDER — POLYETHYLENE GLYCOL 3350, SODIUM CHLORIDE, SODIUM BICARBONATE, POTASSIUM CHLORIDE 420; 11.2; 5.72; 1.48 G/4L; G/4L; G/4L; G/4L
4000 POWDER, FOR SOLUTION ORAL ONCE
Qty: 4000 ML | Refills: 0 | Status: SHIPPED | OUTPATIENT
Start: 2025-03-10 | End: 2025-03-10

## 2025-03-10 RX ORDER — COLESTIPOL HYDROCHLORIDE 1 G/1
1 TABLET ORAL DAILY
Qty: 30 TABLET | Refills: 3 | Status: SHIPPED | OUTPATIENT
Start: 2025-03-10

## 2025-03-10 ASSESSMENT — ENCOUNTER SYMPTOMS
VOMITING: 0
VOICE CHANGE: 0
ANAL BLEEDING: 0
CHOKING: 0
CONSTIPATION: 0
BLOOD IN STOOL: 0
SORE THROAT: 0
RECTAL PAIN: 0
WHEEZING: 0
DIARRHEA: 0
TROUBLE SWALLOWING: 0
ABDOMINAL DISTENTION: 0
ABDOMINAL PAIN: 0
NAUSEA: 0
COUGH: 0

## 2025-03-10 NOTE — PROGRESS NOTES
GI CLINIC FOLLOW UP    INTERVAL HISTORY:   No referring provider defined for this encounter.    Chief Complaint   Patient presents with    Follow-up     3 month RTC        HISTORY OF PRESENT ILLNESS: Mr.Floyd Madrid is a 69 y.o. male , referred for evaluation of history of colectomy for diverticulosis, diarrhea.    Here for f/u  Previously required colestid for diarrhea however has been able to stop taking  States he still has intermittent loose stools approx 1x per week  Interested in restarting the colestid  Otherwise he has no complaints  Denies unintentional weight loss, dysphagia/odynophagia, n/v, abd pain, black or bloody stools.     At last visit, colonoscopy was ordered - scheduled for 3/31/25  He is on Plavix from cardiology (stents 2009) - Dr Stveens at [facility] - have not received clearance yet - they are trying to squeeze him in before his colonoscopy    Former smoker  Very rare etoh  No ASA 81     Last colonoscopy was 11/29/21 - showing diverticulosis, polyps, clean anastomosis, severely engorged hemorrhoids 3 yr recall      Past Medical,Family, and Social History reviewed and does contribute to the patient presentingcondition.    I did review all the labs results available for the labs which were ordered by the primary care physician, and the other consultants, we search on epic at [facility] and all the available care everywhere epic    I did review all the imaging studies of the abdomen available on EMR, ordered by the primary care physician and the other consultant    I did review all the pathology from the biopsies done on the previous endoscopies    Patient's PMH/PSH,SH,PSYCH Hx, MEDs, ALLERGIES, and ROS were all reviewed and updated in the appropriate sections.    PAST MEDICAL HISTORY:  Past Medical History:   Diagnosis Date    COPD (chronic obstructive pulmonary disease) (HCC)     Diabetes mellitus (HCC)     Epidermoid cyst 08/12/2024    Hypertension     Mass of soft tissue of chest

## 2025-03-20 ENCOUNTER — OFFICE VISIT (OUTPATIENT)
Dept: PRIMARY CARE CLINIC | Age: 70
End: 2025-03-20

## 2025-03-20 VITALS
BODY MASS INDEX: 31.22 KG/M2 | HEIGHT: 68 IN | WEIGHT: 206 LBS | DIASTOLIC BLOOD PRESSURE: 76 MMHG | OXYGEN SATURATION: 95 % | SYSTOLIC BLOOD PRESSURE: 130 MMHG | HEART RATE: 72 BPM

## 2025-03-20 DIAGNOSIS — E11.69 HYPERLIPIDEMIA ASSOCIATED WITH TYPE 2 DIABETES MELLITUS: ICD-10-CM

## 2025-03-20 DIAGNOSIS — E11.65 TYPE 2 DIABETES MELLITUS WITH HYPERGLYCEMIA, WITHOUT LONG-TERM CURRENT USE OF INSULIN (HCC): Primary | ICD-10-CM

## 2025-03-20 DIAGNOSIS — R35.0 BENIGN PROSTATIC HYPERPLASIA WITH URINARY FREQUENCY: ICD-10-CM

## 2025-03-20 DIAGNOSIS — Z23 NEED FOR VACCINATION: ICD-10-CM

## 2025-03-20 DIAGNOSIS — E78.5 HYPERLIPIDEMIA ASSOCIATED WITH TYPE 2 DIABETES MELLITUS: ICD-10-CM

## 2025-03-20 DIAGNOSIS — G47.9 SLEEPING DIFFICULTY: ICD-10-CM

## 2025-03-20 DIAGNOSIS — N40.1 BENIGN PROSTATIC HYPERPLASIA WITH URINARY FREQUENCY: ICD-10-CM

## 2025-03-20 DIAGNOSIS — J44.9 CHRONIC OBSTRUCTIVE PULMONARY DISEASE, UNSPECIFIED COPD TYPE (HCC): ICD-10-CM

## 2025-03-20 LAB — HBA1C MFR BLD: 6.9 %

## 2025-03-20 RX ORDER — TAMSULOSIN HYDROCHLORIDE 0.4 MG/1
0.8 CAPSULE ORAL DAILY
Qty: 180 CAPSULE | Refills: 3 | Status: SHIPPED | OUTPATIENT
Start: 2025-03-20

## 2025-03-20 RX ORDER — CLOPIDOGREL BISULFATE 75 MG/1
75 TABLET ORAL DAILY
COMMUNITY

## 2025-03-20 RX ORDER — UMECLIDINIUM BROMIDE AND VILANTEROL TRIFENATATE 62.5; 25 UG/1; UG/1
1 POWDER RESPIRATORY (INHALATION) DAILY
Qty: 60 EACH | Refills: 2 | Status: SHIPPED | OUTPATIENT
Start: 2025-03-20

## 2025-03-20 SDOH — ECONOMIC STABILITY: FOOD INSECURITY: WITHIN THE PAST 12 MONTHS, THE FOOD YOU BOUGHT JUST DIDN'T LAST AND YOU DIDN'T HAVE MONEY TO GET MORE.: NEVER TRUE

## 2025-03-20 SDOH — ECONOMIC STABILITY: FOOD INSECURITY: WITHIN THE PAST 12 MONTHS, YOU WORRIED THAT YOUR FOOD WOULD RUN OUT BEFORE YOU GOT MONEY TO BUY MORE.: NEVER TRUE

## 2025-03-20 ASSESSMENT — PATIENT HEALTH QUESTIONNAIRE - PHQ9
SUM OF ALL RESPONSES TO PHQ QUESTIONS 1-9: 0
SUM OF ALL RESPONSES TO PHQ QUESTIONS 1-9: 0
1. LITTLE INTEREST OR PLEASURE IN DOING THINGS: NOT AT ALL
SUM OF ALL RESPONSES TO PHQ QUESTIONS 1-9: 0
2. FEELING DOWN, DEPRESSED OR HOPELESS: NOT AT ALL
SUM OF ALL RESPONSES TO PHQ QUESTIONS 1-9: 0

## 2025-03-20 NOTE — ASSESSMENT & PLAN NOTE
Chronic, at goal (stable), He is currently on clopidogrel and has a colonoscopy scheduled for 03/31/2025. He was taken off low-dose aspirin in May 2024. He has been advised to discontinue clopidogrel seven days prior to his scheduled colonoscopy on 03/31/2025, unless otherwise instructed by his cardiologist.

## 2025-03-20 NOTE — ASSESSMENT & PLAN NOTE
Chronic, not at goal (unstable), His insomnia may be partially attributed to his nocturia. He has been counseled to avoid stimulating activities upon awakening during the night. The use of the CBT-I  ghanshyam has been recommended to aid in sleep hygiene. If his insomnia persists despite these interventions, further treatment options will be explored.

## 2025-03-20 NOTE — PROGRESS NOTES
[facility]  [facility] PRIMARY CARE  [address] [place] [id number]  Dept: [phone]       Date of Visit:  3/20/2025  Patient Name: Osman Madrid   Patient :  1955     CHIEF COMPLAINT:     Osman Madrid is a 69 y.o. male who presents today to be evaluated for the following condition(s):  Chief Complaint   Patient presents with    Diabetes     Jardiance, Glipizide, Metformin. Pt does check his BG at home, last reading was 147. No concerns. Pt's last hgb A1c was 7.1 on 24. He is due for a DM retinal exam.     Hypertension     Amlodipine, Carvedilol, Lisinopril, Lisinopril-HCTZ. Pt does not check his BP at home. No concerns. Pt denies chest pain, SOB, HA, dizziness, LE edema.     Other     FR[place]LIN [place]       HISTORY OF PRESENT ILLNESS:      History of Present Illness  The patient presents for evaluation of urinary frequency, insomnia, diabetes mellitus, and COPD.    He reports persistent nocturia, necessitating urination 3 to 4 times per night. He also experiences a weak urinary stream, particularly at night, and occasional urgency and frequency. He does not report any urinary incontinence. His daily fluid intake consists of 4 to 5 bottles of water. He has been on tamsulosin for approximately 3 months, which initially provided relief but has recently become less effective. He has no personal history of prostate cancer but notes that his brother  of cancer at the age of 74, although he is uncertain whether it was prostate or colon cancer.    He is experiencing sleep disturbances, characterized by waking up after 2 hours of sleep and remaining awake for 5 to 6 hours before achieving another 1 to 2 hours of sleep. His bedtime varies between 10:30 PM and midnight. Upon awakening, he engages in activities such as using his phone or watching television. He has not attempted any over-the-counter sleep aids and does not nap during the day. He expresses a preference for

## 2025-03-20 NOTE — ASSESSMENT & PLAN NOTE
Chronic, not at goal (unstable), He reports frequent urination at night, which may be contributing to his sleep disturbances. He will increase his tamsulosin dosage to two tablets daily. A prescription for a 90-day supply of tamsulosin has been provided. He has been advised to monitor for any lightheadedness or dizziness, particularly with positional changes, and to report any such symptoms. Finasteride will be considered as an alternative treatment option if needed, but a repeat PSA test may be required before initiation.    Orders:    tamsulosin (FLOMAX) 0.4 MG capsule; Take 2 capsules by mouth daily

## 2025-03-20 NOTE — PATIENT INSTRUCTIONS
For insomnia, recommend checking out this free ghanshyam from the [facility]:    https://[employer]veMint.va.gov/ghanshyam/cbt-i-    For your COPD, here is a link to Anoro's website:    https://www.anoro.com/about-anoro/how-to-use-anoro/

## 2025-03-20 NOTE — ASSESSMENT & PLAN NOTE
Chronic, at goal (stable), His A1c level is well-controlled at 6.9. He has been advised to continue his current dietary habits, which include consuming fruits. He will arrange an appointment with an ophthalmologist for a diabetic eye exam.    Orders:    POCT glycosylated hemoglobin (Hb A1C)

## 2025-03-20 NOTE — ASSESSMENT & PLAN NOTE
Chronic, at goal (stable), He reports frequent use of his albuterol inhaler, approximately three to four times a day over the past week. Anoro inhaler has been prescribed for daily use, with instructions provided on its proper use. He has been advised to discontinue the use of ipratropium. If he encounters financial difficulties in obtaining the Anoro inhaler, he is to contact us for assistance.    Orders:    umeclidinium-vilanterol (ANORO ELLIPTA) 62.5-25 MCG/ACT inhaler; Inhale 1 puff into the lungs daily

## 2025-03-21 DIAGNOSIS — N40.1 BENIGN PROSTATIC HYPERPLASIA WITH URINARY FREQUENCY: ICD-10-CM

## 2025-03-21 DIAGNOSIS — R35.0 BENIGN PROSTATIC HYPERPLASIA WITH URINARY FREQUENCY: ICD-10-CM

## 2025-03-21 RX ORDER — TAMSULOSIN HYDROCHLORIDE 0.4 MG/1
0.4 CAPSULE ORAL DAILY
Qty: 30 CAPSULE | Refills: 0 | OUTPATIENT
Start: 2025-03-21

## 2025-03-28 ENCOUNTER — ANESTHESIA EVENT (OUTPATIENT)
Dept: OPERATING ROOM | Age: 70
End: 2025-03-28
Payer: MEDICARE

## 2025-03-28 NOTE — PRE-PROCEDURE INSTRUCTIONS
PAT phone call for colonoscopy completed with pt.    Date/time/location (entrance C) of procedure verified with pt.    NPO after MN status verified with pt.    Need for  ( wife  )  verified with pt.    Verified need to complete bowel prep/instructions per . Instructed pt to check Mychart for instructions and call Dr. Leslie office for them if unable to find.     Instructed pt to take a shower the AM of surgery/procedure and to avoid lotions, creams, jewelry.    Instructed pt to take BP meds: Amlodipine, Carvedilol and Lisinopril with a small sip of water prior to procedure.

## 2025-03-31 ENCOUNTER — HOSPITAL ENCOUNTER (OUTPATIENT)
Age: 70
Setting detail: OUTPATIENT SURGERY
Discharge: HOME | End: 2025-03-31
Attending: INTERNAL MEDICINE | Admitting: INTERNAL MEDICINE
Payer: MEDICARE

## 2025-03-31 ENCOUNTER — ANESTHESIA (OUTPATIENT)
Dept: OPERATING ROOM | Age: 70
End: 2025-03-31
Payer: MEDICARE

## 2025-03-31 VITALS
TEMPERATURE: 96.8 F | WEIGHT: 204 LBS | OXYGEN SATURATION: 95 % | RESPIRATION RATE: 14 BRPM | DIASTOLIC BLOOD PRESSURE: 58 MMHG | HEIGHT: 68 IN | SYSTOLIC BLOOD PRESSURE: 111 MMHG | BODY MASS INDEX: 30.92 KG/M2 | HEART RATE: 61 BPM

## 2025-03-31 DIAGNOSIS — Z12.11 ENCOUNTER FOR SCREENING COLONOSCOPY: ICD-10-CM

## 2025-03-31 LAB — GLUCOSE BLD-MCNC: 222 MG/DL (ref 75–110)

## 2025-03-31 PROCEDURE — 7100000011 HC PHASE II RECOVERY - ADDTL 15 MIN: Performed by: INTERNAL MEDICINE

## 2025-03-31 PROCEDURE — 88305 TISSUE EXAM BY PATHOLOGIST: CPT

## 2025-03-31 PROCEDURE — 3700000001 HC ADD 15 MINUTES (ANESTHESIA): Performed by: INTERNAL MEDICINE

## 2025-03-31 PROCEDURE — 82947 ASSAY GLUCOSE BLOOD QUANT: CPT

## 2025-03-31 PROCEDURE — 45385 COLONOSCOPY W/LESION REMOVAL: CPT | Performed by: INTERNAL MEDICINE

## 2025-03-31 PROCEDURE — 7100000010 HC PHASE II RECOVERY - FIRST 15 MIN: Performed by: INTERNAL MEDICINE

## 2025-03-31 PROCEDURE — 3700000000 HC ANESTHESIA ATTENDED CARE: Performed by: INTERNAL MEDICINE

## 2025-03-31 PROCEDURE — 3609010700 HC COLONOSCOPY POLYPECTOMY REMOVAL SNARE/STOMA: Performed by: INTERNAL MEDICINE

## 2025-03-31 PROCEDURE — 6360000002 HC RX W HCPCS: Performed by: NURSE ANESTHETIST, CERTIFIED REGISTERED

## 2025-03-31 PROCEDURE — 2709999900 HC NON-CHARGEABLE SUPPLY: Performed by: INTERNAL MEDICINE

## 2025-03-31 PROCEDURE — 2500000003 HC RX 250 WO HCPCS: Performed by: STUDENT IN AN ORGANIZED HEALTH CARE EDUCATION/TRAINING PROGRAM

## 2025-03-31 PROCEDURE — 2580000003 HC RX 258: Performed by: STUDENT IN AN ORGANIZED HEALTH CARE EDUCATION/TRAINING PROGRAM

## 2025-03-31 PROCEDURE — 6370000000 HC RX 637 (ALT 250 FOR IP)

## 2025-03-31 RX ORDER — IPRATROPIUM BROMIDE AND ALBUTEROL SULFATE 2.5; .5 MG/3ML; MG/3ML
1 SOLUTION RESPIRATORY (INHALATION)
Status: DISCONTINUED | OUTPATIENT
Start: 2025-03-31 | End: 2025-03-31 | Stop reason: HOSPADM

## 2025-03-31 RX ORDER — PROPOFOL 10 MG/ML
INJECTION, EMULSION INTRAVENOUS
Status: DISCONTINUED | OUTPATIENT
Start: 2025-03-31 | End: 2025-03-31 | Stop reason: SDUPTHER

## 2025-03-31 RX ORDER — OXYCODONE AND ACETAMINOPHEN 5; 325 MG/1; MG/1
2 TABLET ORAL
Status: DISCONTINUED | OUTPATIENT
Start: 2025-03-31 | End: 2025-03-31 | Stop reason: HOSPADM

## 2025-03-31 RX ORDER — MORPHINE SULFATE 2 MG/ML
2 INJECTION, SOLUTION INTRAMUSCULAR; INTRAVENOUS EVERY 5 MIN PRN
Status: DISCONTINUED | OUTPATIENT
Start: 2025-03-31 | End: 2025-03-31 | Stop reason: HOSPADM

## 2025-03-31 RX ORDER — GLYCOPYRROLATE 0.2 MG/ML
0.4 INJECTION INTRAMUSCULAR; INTRAVENOUS ONCE
Status: DISCONTINUED | OUTPATIENT
Start: 2025-03-31 | End: 2025-03-31 | Stop reason: HOSPADM

## 2025-03-31 RX ORDER — LABETALOL HYDROCHLORIDE 5 MG/ML
10 INJECTION, SOLUTION INTRAVENOUS
Status: DISCONTINUED | OUTPATIENT
Start: 2025-03-31 | End: 2025-03-31 | Stop reason: HOSPADM

## 2025-03-31 RX ORDER — SODIUM CHLORIDE 0.9 % (FLUSH) 0.9 %
5-40 SYRINGE (ML) INJECTION EVERY 12 HOURS SCHEDULED
Status: DISCONTINUED | OUTPATIENT
Start: 2025-03-31 | End: 2025-03-31 | Stop reason: HOSPADM

## 2025-03-31 RX ORDER — METOCLOPRAMIDE HYDROCHLORIDE 5 MG/ML
10 INJECTION INTRAMUSCULAR; INTRAVENOUS
Status: DISCONTINUED | OUTPATIENT
Start: 2025-03-31 | End: 2025-03-31 | Stop reason: HOSPADM

## 2025-03-31 RX ORDER — IPRATROPIUM BROMIDE AND ALBUTEROL SULFATE 2.5; .5 MG/3ML; MG/3ML
1 SOLUTION RESPIRATORY (INHALATION) ONCE
Status: COMPLETED | OUTPATIENT
Start: 2025-03-31 | End: 2025-03-31

## 2025-03-31 RX ORDER — DIPHENHYDRAMINE HYDROCHLORIDE 50 MG/ML
12.5 INJECTION, SOLUTION INTRAMUSCULAR; INTRAVENOUS
Status: DISCONTINUED | OUTPATIENT
Start: 2025-03-31 | End: 2025-03-31 | Stop reason: HOSPADM

## 2025-03-31 RX ORDER — SODIUM CHLORIDE, SODIUM LACTATE, POTASSIUM CHLORIDE, CALCIUM CHLORIDE 600; 310; 30; 20 MG/100ML; MG/100ML; MG/100ML; MG/100ML
INJECTION, SOLUTION INTRAVENOUS CONTINUOUS
Status: DISCONTINUED | OUTPATIENT
Start: 2025-03-31 | End: 2025-03-31 | Stop reason: HOSPADM

## 2025-03-31 RX ORDER — SODIUM CHLORIDE 0.9 % (FLUSH) 0.9 %
5-40 SYRINGE (ML) INJECTION PRN
Status: DISCONTINUED | OUTPATIENT
Start: 2025-03-31 | End: 2025-03-31 | Stop reason: HOSPADM

## 2025-03-31 RX ORDER — SODIUM CHLORIDE 9 MG/ML
INJECTION, SOLUTION INTRAVENOUS PRN
Status: DISCONTINUED | OUTPATIENT
Start: 2025-03-31 | End: 2025-03-31 | Stop reason: HOSPADM

## 2025-03-31 RX ORDER — NALOXONE HYDROCHLORIDE 0.4 MG/ML
INJECTION, SOLUTION INTRAMUSCULAR; INTRAVENOUS; SUBCUTANEOUS PRN
Status: DISCONTINUED | OUTPATIENT
Start: 2025-03-31 | End: 2025-03-31 | Stop reason: HOSPADM

## 2025-03-31 RX ORDER — OXYCODONE AND ACETAMINOPHEN 5; 325 MG/1; MG/1
1 TABLET ORAL
Status: DISCONTINUED | OUTPATIENT
Start: 2025-03-31 | End: 2025-03-31 | Stop reason: HOSPADM

## 2025-03-31 RX ORDER — MEPERIDINE HYDROCHLORIDE 50 MG/ML
12.5 INJECTION INTRAMUSCULAR; INTRAVENOUS; SUBCUTANEOUS EVERY 5 MIN PRN
Status: DISCONTINUED | OUTPATIENT
Start: 2025-03-31 | End: 2025-03-31 | Stop reason: HOSPADM

## 2025-03-31 RX ORDER — LIDOCAINE HYDROCHLORIDE 10 MG/ML
1 INJECTION, SOLUTION EPIDURAL; INFILTRATION; INTRACAUDAL; PERINEURAL
Status: DISCONTINUED | OUTPATIENT
Start: 2025-03-31 | End: 2025-03-31 | Stop reason: HOSPADM

## 2025-03-31 RX ORDER — LIDOCAINE HYDROCHLORIDE 10 MG/ML
INJECTION, SOLUTION EPIDURAL; INFILTRATION; INTRACAUDAL; PERINEURAL
Status: DISCONTINUED | OUTPATIENT
Start: 2025-03-31 | End: 2025-03-31 | Stop reason: SDUPTHER

## 2025-03-31 RX ORDER — IPRATROPIUM BROMIDE AND ALBUTEROL SULFATE 2.5; .5 MG/3ML; MG/3ML
SOLUTION RESPIRATORY (INHALATION)
Status: DISCONTINUED
Start: 2025-03-31 | End: 2025-03-31 | Stop reason: HOSPADM

## 2025-03-31 RX ORDER — HYDRALAZINE HYDROCHLORIDE 20 MG/ML
10 INJECTION INTRAMUSCULAR; INTRAVENOUS
Status: DISCONTINUED | OUTPATIENT
Start: 2025-03-31 | End: 2025-03-31 | Stop reason: HOSPADM

## 2025-03-31 RX ORDER — ONDANSETRON 2 MG/ML
4 INJECTION INTRAMUSCULAR; INTRAVENOUS
Status: DISCONTINUED | OUTPATIENT
Start: 2025-03-31 | End: 2025-03-31 | Stop reason: HOSPADM

## 2025-03-31 RX ORDER — IPRATROPIUM BROMIDE AND ALBUTEROL SULFATE 2.5; .5 MG/3ML; MG/3ML
SOLUTION RESPIRATORY (INHALATION)
Status: COMPLETED
Start: 2025-03-31 | End: 2025-03-31

## 2025-03-31 RX ORDER — MIDAZOLAM HYDROCHLORIDE 2 MG/2ML
2 INJECTION, SOLUTION INTRAMUSCULAR; INTRAVENOUS
Status: DISCONTINUED | OUTPATIENT
Start: 2025-03-31 | End: 2025-03-31 | Stop reason: HOSPADM

## 2025-03-31 RX ADMIN — SODIUM CHLORIDE, PRESERVATIVE FREE 10 ML: 5 INJECTION INTRAVENOUS at 08:16

## 2025-03-31 RX ADMIN — PROPOFOL 100 MG: 10 INJECTION, EMULSION INTRAVENOUS at 09:02

## 2025-03-31 RX ADMIN — PROPOFOL 50 MG: 10 INJECTION, EMULSION INTRAVENOUS at 09:11

## 2025-03-31 RX ADMIN — IPRATROPIUM BROMIDE AND ALBUTEROL SULFATE 1 DOSE: 2.5; .5 SOLUTION RESPIRATORY (INHALATION) at 08:31

## 2025-03-31 RX ADMIN — INSULIN HUMAN 4 UNITS: 100 INJECTION, SOLUTION PARENTERAL at 08:31

## 2025-03-31 RX ADMIN — LIDOCAINE HYDROCHLORIDE 50 MG: 10 INJECTION, SOLUTION EPIDURAL; INFILTRATION; INTRACAUDAL; PERINEURAL at 09:04

## 2025-03-31 RX ADMIN — PROPOFOL 50 MG: 10 INJECTION, EMULSION INTRAVENOUS at 09:15

## 2025-03-31 RX ADMIN — PROPOFOL 50 MG: 10 INJECTION, EMULSION INTRAVENOUS at 09:07

## 2025-03-31 RX ADMIN — PROPOFOL 50 MG: 10 INJECTION, EMULSION INTRAVENOUS at 09:20

## 2025-03-31 RX ADMIN — IPRATROPIUM BROMIDE AND ALBUTEROL SULFATE 1 DOSE: .5; 2.5 SOLUTION RESPIRATORY (INHALATION) at 08:31

## 2025-03-31 RX ADMIN — SODIUM CHLORIDE: 0.9 INJECTION, SOLUTION INTRAVENOUS at 08:16

## 2025-03-31 ASSESSMENT — PAIN - FUNCTIONAL ASSESSMENT
PAIN_FUNCTIONAL_ASSESSMENT: 0-10
PAIN_FUNCTIONAL_ASSESSMENT: NONE - DENIES PAIN

## 2025-03-31 NOTE — DISCHARGE INSTRUCTIONS

## 2025-03-31 NOTE — H&P
Procedure History and Physical    Pre-Procedural Diagnosis:    Hx polyps   Diarrhea       Indications:  same    Procedure Planned: colonoscopy     History Obtained From:  patient    HISTORY OF PRESENT ILLNESS:       The patient is a 69 y.o. male who presents for the above procedure.        Past Medical History:    Past Medical History:   Diagnosis Date    CAD (coronary artery disease)     COPD (chronic obstructive pulmonary disease) (HCC)     Diabetes mellitus (HCC)     Epidermoid cyst 08/12/2024    Hypertension     Mass of soft tissue of chest 05/03/2024    1.5 cm x 1.7 cm firm, mobile soft tissue mass below left breast         Past Surgical History:    Past Surgical History:   Procedure Laterality Date    COLONOSCOPY  11/29/2021    COLONOSCOPY DIAGNOSTIC    COLONOSCOPY N/A 11/29/2021    COLONOSCOPY SIGMOID COLON POLYPECTOMIES SNARE/RANDOM COLON BIOPSIES performed by Violet Luther MD at [facility] OR    CORONARY ANGIOPLASTY WITH STENT PLACEMENT  2009    CORONARY ANGIOPLASTY WITH STENT PLACEMENT  2016    SKIN LESION EXCISION Left 05/31/2024    LEFT CHEST SUBCUTANEOUS CYST EXCISION performed by Rosales Richmond DO at [facility] OR       Medications:  Current Facility-Administered Medications   Medication Dose Route Frequency Provider Last Rate Last Admin    lidocaine PF 1 % injection 1 mL  1 mL IntraDERmal Once PRN Denisse Vasquez MD        lactated ringers infusion   IntraVENous Continuous Denisse Vasquez MD        sodium chloride flush 0.9 % injection 5-40 mL  5-40 mL IntraVENous 2 times per day Denisse Vasquez MD        sodium chloride flush 0.9 % injection 5-40 mL  5-40 mL IntraVENous PRN Denisse Vasquez MD   10 mL at 03/31/25 0816    0.9 % sodium chloride infusion   IntraVENous PRN Denisse Vasquez  mL/hr at 03/31/25 0822 NoRateChange at 03/31/25 0822    ipratropium 0.5 mg-albuterol 2.5 mg (DUONEB) 0.5-2.5 (3) MG/3ML nebulizer solution                Allergies:   No Known Allergies              Social

## 2025-03-31 NOTE — OP NOTE
PROCEDURE NOTE    DATE OF PROCEDURE: 3/31/2025    SURGEON: Violet Luther MD  Facility : [facility]   ASSISTANT: None  Anesthesia: MAC  PREOPERATIVE DIAGNOSIS:   History of colon cancer      POSTOPERATIVE DIAGNOSIS: as described below    OPERATION: Total colonoscopy     ANESTHESIA: Moderate Sedation    ESTIMATED BLOOD LOSS: less than 50     COMPLICATIONS: None.     SPECIMENS:  Was Obtained:     As below    HISTORY: The patient is a 69 y.o. year old male with history of above preop diagnosis.  I recommended colonoscopy with possible biopsy or polypectomy and I explained the risk, benefits, expected outcome, and alternatives to the procedure.  Risks included but are not limited to bleeding, infection, respiratory distress, hypotension, and perforation of the colon and possibility of missing a lesion.  The patient understands and is in agreement.        The patient was counseled at length about the risks of jonelle Covid-19 during their perioperative period and any recovery window from their procedure.  The patient was made aware that jonelle Covid-19  may worsen their prognosis for recovering from their procedure  and lend to a higher morbidity and/or mortality risk.  All material risks, benefits, and reasonable alternatives including postponing the procedure were discussed. The patient does wish to proceed with the procedure at this time.       PROCEDURE: The patient was given IV conscious sedation.  The patient's SPO2 remained above 90% throughout the procedure.     The colonoscope was inserted per rectum and advanced under direct vision to the cecum without difficulty.      Post sedation note :The patient's SPO2 remained above 90% throughout the procedure.the vital signs remained stable , and no immediate complication form the procedure noted, patient will be ready for d/c when criteria is met .        The prep was fair.      Findings:  Terminal ileum: normal  2 polyps in the sigmoid colon the larger 1

## 2025-03-31 NOTE — ANESTHESIA POSTPROCEDURE EVALUATION
Department of Anesthesiology  Postprocedure Note    Patient: Osman Madrid  MRN: [id number]  YOB: 1955  Date of evaluation: 3/31/2025    Procedure Summary       Date: 03/31/25 Room / Location: [facility] PROCEDURE ROOM / [facility]    Anesthesia Start: 0901 Anesthesia Stop: 0930    Procedure: COLONOSCOPY POLYPECTOMY REMOVAL SNARE/STOMA Diagnosis:       Encounter for screening colonoscopy      (Encounter for screening colonoscopy [Z12.11])    Surgeons: Violet Luther MD Responsible Provider: Rai Springer MD    Anesthesia Type: MAC ASA Status: 3            Anesthesia Type: No value filed.    Kojo Phase I: Kojo Score: 10    Kojo Phase II: Kojo Score: 10    Anesthesia Post Evaluation    Patient location during evaluation: PACU  Patient participation: complete - patient participated  Level of consciousness: awake and alert  Airway patency: patent  Nausea & Vomiting: no nausea and no vomiting  Cardiovascular status: hemodynamically stable  Respiratory status: room air and spontaneous ventilation  Hydration status: euvolemic  Multimodal analgesia pain management approach  Pain management: adequate    No notable events documented.

## 2025-03-31 NOTE — ANESTHESIA PRE PROCEDURE
Department of Anesthesiology  Preprocedure Note       Name:  Osman Madrid   Age:  69 y.o.  :  1955                                          MRN:  [id number]         Date:  3/31/2025      Surgeon: Surgeon(s):  Violet Luther MD    Procedure: Procedure(s):  COLORECTAL CANCER SCREENING, NOT HIGH RISK    Medications prior to admission:   Prior to Admission medications    Medication Sig Start Date End Date Taking? Authorizing Provider   tamsulosin (FLOMAX) 0.4 MG capsule Take 2 capsules by mouth daily 3/20/25  Yes Master Hurtado,    colestipol (COLESTID) 1 g tablet Take 1 tablet by mouth daily 3/10/25  Yes Magda Lopez PA-C   metFORMIN (GLUCOPHAGE-XR) 500 MG extended release tablet Take 2 tablets by mouth with breakfast and 2 with supper 25  Yes Master Hurtado DO   empagliflozin (JARDIANCE) 25 MG tablet Take 1 tablet by mouth daily 24  Yes Master Hurtado DO   glipiZIDE (GLUCOTROL XL) 5 MG extended release tablet Take 2 tablets by mouth daily (with breakfast) AND 1 tablet Daily with supper. 10/16/24  Yes Master Hurtado DO   lisinopril-hydroCHLOROthiazide (PRINZIDE;ZESTORETIC) 20-25 MG per tablet Take 1 tablet by mouth daily 24  Yes Derian Hubbard MD   lisinopril (PRINIVIL;ZESTRIL) 10 MG tablet Take 1 tablet by mouth daily 24  Yes Derian Hubbard MD   atorvastatin (LIPITOR) 20 MG tablet Take 1 tablet by mouth every morning 23  Yes Jose Neves MD   amLODIPine (NORVASC) 10 MG tablet Take 1 tablet by mouth daily 21  Yes Jose Neves MD   carvedilol (COREG) 6.25 MG tablet Take 1 tablet by mouth 2 times daily 21  Yes Jose Neves MD   clopidogrel (PLAVIX) 75 MG tablet Take 1 tablet by mouth daily    Jose Neves MD   umeclidinium-vilanterol (ANORO ELLIPTA) 62.5-25 MCG/ACT inhaler Inhale 1 puff into the lungs daily 3/20/25   Master Hurtado,    fluticasone (FLONASE) 50 MCG/ACT nasal spray 1 spray by Each Nostril

## 2025-04-02 LAB — SURGICAL PATHOLOGY REPORT: NORMAL

## 2025-04-17 DIAGNOSIS — I10 ESSENTIAL HYPERTENSION: ICD-10-CM

## 2025-04-17 NOTE — TELEPHONE ENCOUNTER
LAST VISIT:   3/20/2025     Future Appointments   Date Time Provider Department Center   6/24/2025  8:00 AM Master Hurtado DO [place] [facility] DEP

## 2025-04-21 RX ORDER — LISINOPRIL 10 MG/1
10 TABLET ORAL DAILY
Qty: 90 TABLET | Refills: 3 | Status: SHIPPED | OUTPATIENT
Start: 2025-04-21

## 2025-04-23 ENCOUNTER — PATIENT MESSAGE (OUTPATIENT)
Dept: PRIMARY CARE CLINIC | Age: 70
End: 2025-04-23

## 2025-04-23 DIAGNOSIS — I10 ESSENTIAL HYPERTENSION: ICD-10-CM

## 2025-04-24 NOTE — TELEPHONE ENCOUNTER
LAST VISIT:   3/20/2025     Future Appointments   Date Time Provider Department Center   6/24/2025  8:00 AM Master Hurtado DO [place] [facility] DEP

## 2025-04-25 RX ORDER — LISINOPRIL AND HYDROCHLOROTHIAZIDE 20; 25 MG/1; MG/1
1 TABLET ORAL DAILY
Qty: 90 TABLET | Refills: 1 | Status: SHIPPED | OUTPATIENT
Start: 2025-04-25

## 2025-06-24 ENCOUNTER — OFFICE VISIT (OUTPATIENT)
Dept: PRIMARY CARE CLINIC | Age: 70
End: 2025-06-24
Payer: MEDICARE

## 2025-06-24 VITALS
DIASTOLIC BLOOD PRESSURE: 74 MMHG | BODY MASS INDEX: 32.13 KG/M2 | WEIGHT: 212 LBS | HEART RATE: 65 BPM | SYSTOLIC BLOOD PRESSURE: 132 MMHG | HEIGHT: 68 IN | OXYGEN SATURATION: 95 %

## 2025-06-24 DIAGNOSIS — J30.1 SEASONAL ALLERGIC RHINITIS DUE TO POLLEN: ICD-10-CM

## 2025-06-24 DIAGNOSIS — Z23 NEED FOR VACCINATION: ICD-10-CM

## 2025-06-24 DIAGNOSIS — Z87.891 ENCOUNTER FOR SCREENING FOR ABDOMINAL AORTIC ANEURYSM (AAA) IN PATIENT 50 YEARS OF AGE OR OLDER WITH HISTORY OF SMOKING: ICD-10-CM

## 2025-06-24 DIAGNOSIS — Z13.6 ENCOUNTER FOR SCREENING FOR ABDOMINAL AORTIC ANEURYSM (AAA) IN PATIENT 50 YEARS OF AGE OR OLDER WITH HISTORY OF SMOKING: ICD-10-CM

## 2025-06-24 DIAGNOSIS — J44.9 CHRONIC OBSTRUCTIVE PULMONARY DISEASE, UNSPECIFIED COPD TYPE (HCC): ICD-10-CM

## 2025-06-24 DIAGNOSIS — S60.511A ABRASION OF RIGHT HAND, INITIAL ENCOUNTER: ICD-10-CM

## 2025-06-24 DIAGNOSIS — E11.65 TYPE 2 DIABETES MELLITUS WITH HYPERGLYCEMIA, WITHOUT LONG-TERM CURRENT USE OF INSULIN (HCC): Primary | ICD-10-CM

## 2025-06-24 LAB — HBA1C MFR BLD: 7 %

## 2025-06-24 PROCEDURE — 83036 HEMOGLOBIN GLYCOSYLATED A1C: CPT | Performed by: STUDENT IN AN ORGANIZED HEALTH CARE EDUCATION/TRAINING PROGRAM

## 2025-06-24 PROCEDURE — 1123F ACP DISCUSS/DSCN MKR DOCD: CPT | Performed by: STUDENT IN AN ORGANIZED HEALTH CARE EDUCATION/TRAINING PROGRAM

## 2025-06-24 PROCEDURE — 3051F HG A1C>EQUAL 7.0%<8.0%: CPT | Performed by: STUDENT IN AN ORGANIZED HEALTH CARE EDUCATION/TRAINING PROGRAM

## 2025-06-24 PROCEDURE — 1159F MED LIST DOCD IN RCRD: CPT | Performed by: STUDENT IN AN ORGANIZED HEALTH CARE EDUCATION/TRAINING PROGRAM

## 2025-06-24 PROCEDURE — 3075F SYST BP GE 130 - 139MM HG: CPT | Performed by: STUDENT IN AN ORGANIZED HEALTH CARE EDUCATION/TRAINING PROGRAM

## 2025-06-24 PROCEDURE — 3023F SPIROM DOC REV: CPT | Performed by: STUDENT IN AN ORGANIZED HEALTH CARE EDUCATION/TRAINING PROGRAM

## 2025-06-24 PROCEDURE — 3017F COLORECTAL CA SCREEN DOC REV: CPT | Performed by: STUDENT IN AN ORGANIZED HEALTH CARE EDUCATION/TRAINING PROGRAM

## 2025-06-24 PROCEDURE — 1036F TOBACCO NON-USER: CPT | Performed by: STUDENT IN AN ORGANIZED HEALTH CARE EDUCATION/TRAINING PROGRAM

## 2025-06-24 PROCEDURE — 3078F DIAST BP <80 MM HG: CPT | Performed by: STUDENT IN AN ORGANIZED HEALTH CARE EDUCATION/TRAINING PROGRAM

## 2025-06-24 PROCEDURE — 1160F RVW MEDS BY RX/DR IN RCRD: CPT | Performed by: STUDENT IN AN ORGANIZED HEALTH CARE EDUCATION/TRAINING PROGRAM

## 2025-06-24 PROCEDURE — 99214 OFFICE O/P EST MOD 30 MIN: CPT | Performed by: STUDENT IN AN ORGANIZED HEALTH CARE EDUCATION/TRAINING PROGRAM

## 2025-06-24 PROCEDURE — G8427 DOCREV CUR MEDS BY ELIG CLIN: HCPCS | Performed by: STUDENT IN AN ORGANIZED HEALTH CARE EDUCATION/TRAINING PROGRAM

## 2025-06-24 PROCEDURE — 2022F DILAT RTA XM EVC RTNOPTHY: CPT | Performed by: STUDENT IN AN ORGANIZED HEALTH CARE EDUCATION/TRAINING PROGRAM

## 2025-06-24 PROCEDURE — G8417 CALC BMI ABV UP PARAM F/U: HCPCS | Performed by: STUDENT IN AN ORGANIZED HEALTH CARE EDUCATION/TRAINING PROGRAM

## 2025-06-24 RX ORDER — GLIPIZIDE 5 MG/1
TABLET, FILM COATED, EXTENDED RELEASE ORAL
Qty: 180 TABLET | Refills: 1
Start: 2025-06-24

## 2025-06-24 RX ORDER — FLUTICASONE PROPIONATE 50 MCG
1 SPRAY, SUSPENSION (ML) NASAL DAILY
Qty: 16 G | Refills: 2 | Status: SHIPPED | OUTPATIENT
Start: 2025-06-24

## 2025-06-24 ASSESSMENT — PATIENT HEALTH QUESTIONNAIRE - PHQ9
SUM OF ALL RESPONSES TO PHQ QUESTIONS 1-9: 0
SUM OF ALL RESPONSES TO PHQ QUESTIONS 1-9: 0
2. FEELING DOWN, DEPRESSED OR HOPELESS: NOT AT ALL
SUM OF ALL RESPONSES TO PHQ QUESTIONS 1-9: 0
SUM OF ALL RESPONSES TO PHQ QUESTIONS 1-9: 0
1. LITTLE INTEREST OR PLEASURE IN DOING THINGS: NOT AT ALL

## 2025-06-24 NOTE — ASSESSMENT & PLAN NOTE
Acute condition, new, healing well.  Wound care instructions given.  Will  update tetanus.    Orders:    Tdap, BOOSTRIX, (age 10 yrs+), IM

## 2025-06-24 NOTE — ASSESSMENT & PLAN NOTE
Chronic, at goal (stable), continue current treatment plan  - A1c level today is 7.0, consistent with the previous reading of 6.9.  - Reports no episodes of hypoglycemia, with the lowest blood sugar reading around 100.  - Will continue current regimen of glipizide and Jardiance.  - Prescription for glipizide prepared but will not be sent unless necessary; advised to contact the office if a refill is needed.    Orders:    POCT glycosylated hemoglobin (Hb A1C)    glipiZIDE (GLUCOTROL XL) 5 MG extended release tablet; Takes one tablet in AM and one in PM

## 2025-06-24 NOTE — ASSESSMENT & PLAN NOTE
Chronic, at goal (stable), continue current treatment plan  - Reports that Flonase is helping with symptoms.  - Prescription for Flonase will be sent to the pharmacy.    Orders:    fluticasone (FLONASE) 50 MCG/ACT nasal spray; 1 spray by Each Nostril route daily

## 2025-06-24 NOTE — ASSESSMENT & PLAN NOTE
Chronic, at goal (stable), continue current treatment plan  - Continue Anoro daily, albuterol PRN

## 2025-06-24 NOTE — PROGRESS NOTES
[facility]  [facility]  [address] [place] [id number]  Dept: [phone]       Date of Visit:  2025  Patient Name: Osman Madrid   Patient :  1955     CHIEF COMPLAINT:     Osman Madrid is a 70 y.o. male who presents today to be evaluated for the following condition(s):  Chief Complaint   Patient presents with    Diabetes     No concerns. Pt has not been monitoring his BG at home. Denies sx of hyper or hypoglycemia. Pt's last hgb A1c was 6.9 on 3/20/25. Pt was seen for DM retinal exam in 2025.     COPD     Pt states that he is breathing \"alright\". Pt has not needed his albuterol inhaler lately and states that it's \"been awhile\" since he used it last. He only uses the nebulizer when he is sick.        HISTORY OF PRESENT ILLNESS:      History of Present Illness  The patient presents for evaluation of diabetes mellitus, retinopathy, allergies, and health maintenance.    He has been managing his diabetes with glipizide, taking 1 tablet in the morning and 1 at night as well as Jardiance 25 mg daily. He reports no hypoglycemic episodes, with the lowest recorded blood glucose level being 100. His morning blood glucose levels typically range between 140 and 150, although he does not monitor them regularly. His A1c today is 7.0, which is consistent with his previous result of 6.9. He is satisfied with his current management plan and does not feel the need for any changes.    He recently had an ophthalmology appointment at [facility], where he was advised to maintain his A1c levels. He plans to cancel his next appointment due to insurance coverage limitations. The ophthalmologist mentioned the need to monitor his A1c closely to manage his retinopathy.    Patient reports that his COPD is well controlled; no recent exacerbations.  He is compliant with Anoro and not requiring his albuterol rescue inhaler recently.    He reports that Flonase has been beneficial

## 2025-06-25 PROCEDURE — 90471 IMMUNIZATION ADMIN: CPT | Performed by: STUDENT IN AN ORGANIZED HEALTH CARE EDUCATION/TRAINING PROGRAM

## 2025-06-25 PROCEDURE — 90715 TDAP VACCINE 7 YRS/> IM: CPT | Performed by: STUDENT IN AN ORGANIZED HEALTH CARE EDUCATION/TRAINING PROGRAM

## 2025-07-02 ENCOUNTER — HOSPITAL ENCOUNTER (OUTPATIENT)
Dept: VASCULAR LAB | Age: 70
Discharge: HOME | End: 2025-07-04
Attending: STUDENT IN AN ORGANIZED HEALTH CARE EDUCATION/TRAINING PROGRAM
Payer: MEDICARE

## 2025-07-02 DIAGNOSIS — Z87.891 ENCOUNTER FOR SCREENING FOR ABDOMINAL AORTIC ANEURYSM (AAA) IN PATIENT 50 YEARS OF AGE OR OLDER WITH HISTORY OF SMOKING: ICD-10-CM

## 2025-07-02 DIAGNOSIS — Z13.6 ENCOUNTER FOR SCREENING FOR ABDOMINAL AORTIC ANEURYSM (AAA) IN PATIENT 50 YEARS OF AGE OR OLDER WITH HISTORY OF SMOKING: ICD-10-CM

## 2025-07-02 LAB
VAS AORTA DIST AP: 1.94 CM
VAS AORTA DIST PSV: 91.2 CM/S
VAS AORTA DIST TR: 2.48 CM
VAS AORTA MID AP: 2.02 CM
VAS AORTA MID PSV: 64.1 CM/S
VAS AORTA MID TRANS: 2.82 CM
VAS AORTA PROX AP: 2.49 CM
VAS AORTA PROX PSV: 59.2 CM/S
VAS AORTA PROX TR: 2.58 CM
VAS LEFT COM ILIAC AP: 1.03 CM
VAS LEFT COM ILIAC TRANS: 1.05 CM
VAS RIGHT COM ILIAC AP: 0.93 CM
VAS RIGHT COM ILIAC TRANS: 1.05 CM

## 2025-07-02 PROCEDURE — 76706 US ABDL AORTA SCREEN AAA: CPT | Performed by: SURGERY

## 2025-07-02 PROCEDURE — 76706 US ABDL AORTA SCREEN AAA: CPT

## 2025-08-10 DIAGNOSIS — E11.65 TYPE 2 DIABETES MELLITUS WITH HYPERGLYCEMIA, WITHOUT LONG-TERM CURRENT USE OF INSULIN (HCC): ICD-10-CM

## 2025-08-11 RX ORDER — GLIPIZIDE 5 MG/1
TABLET, FILM COATED, EXTENDED RELEASE ORAL
Qty: 180 TABLET | Refills: 3 | Status: SHIPPED | OUTPATIENT
Start: 2025-08-11

## (undated) DEVICE — ADAPTER CLEANING PORPOISE CLEANING

## (undated) DEVICE — SNARE ENDOSCP POLYP LG 2.4 MM 240 CM 30 MM 2.8 MM CAPTIVATOR

## (undated) DEVICE — DRAPE,LAP,CHOLE,W/TROUGHS,STERILE: Brand: MEDLINE

## (undated) DEVICE — APPLICATOR MEDICATED 26 CC SOLUTION HI LT ORNG CHLORAPREP

## (undated) DEVICE — MHPB GEN MINOR PACK: Brand: MEDLINE INDUSTRIES, INC.

## (undated) DEVICE — POLYP TRAP: Brand: TRAPEASE®

## (undated) DEVICE — BASIN EMSIS 700ML GRAPHITE PLAS KID SHP GRAD

## (undated) DEVICE — DRAPE,UTILITY,XL,4/PK,STERILE: Brand: MEDLINE

## (undated) DEVICE — ERBE NESSY®PLATE 170 SPLIT; 168CM²; CABLE 3M: Brand: ERBE

## (undated) DEVICE — SNARE ENDOSCP L240CM LOOP W13MM SHTH DIA2.4MM SM OVL FLX

## (undated) DEVICE — SOLUTION IRRIG 1000ML 0.9% SOD CHL USP POUR PLAS BTL

## (undated) DEVICE — FLUFF UNDERPAD,MODERATE: Brand: WINGS

## (undated) DEVICE — GLOVE ORANGE PI 7   MSG9070

## (undated) DEVICE — TUBING INSUFFLATION CAP W/ EXT CARBON DIOX ENDO SMARTCAP

## (undated) DEVICE — Device: Brand: DEFENDO VALVE AND CONNECTOR KIT

## (undated) DEVICE — SYRINGE MED 50ML LUERLOCK TIP

## (undated) DEVICE — CONNECTOR TBNG AUX H2O JET DISP FOR OLY 160/180 SER

## (undated) DEVICE — GOWN,POLY REINFORCED,LG: Brand: MEDLINE

## (undated) DEVICE — ELECTRODE PT RET AD L9FT HI MOIST COND ADH HYDRGEL CORDED

## (undated) DEVICE — GAUZE,SPONGE,3"X3",4PLY,NS,LF: Brand: MEDLINE

## (undated) DEVICE — 4-PORT MANIFOLD: Brand: NEPTUNE 2

## (undated) DEVICE — Z DISCONTINUED USE 2751540 TUBING IRRIG L10IN DISP PMP ENDOGATOR

## (undated) DEVICE — TUBING, SUCTION, 3/16" X 10', STRAIGHT: Brand: MEDLINE

## (undated) DEVICE — SINGLE-USE BIOPSY FORCEPS: Brand: RADIAL JAW 4

## (undated) DEVICE — BLADE CLIPPER GEN PURP NS

## (undated) DEVICE — NEEDLE SYR 18GA L1.5IN RED PLAS HUB S STL BLNT FILL W/O

## (undated) DEVICE — PERRYSBURG ENDO PACK: Brand: MEDLINE INDUSTRIES, INC.

## (undated) DEVICE — GLOVE ORANGE PI 7 1/2   MSG9075

## (undated) DEVICE — JELLY,LUBE,STERILE,FLIP TOP,TUBE,2-OZ: Brand: MEDLINE

## (undated) DEVICE — LIQUIBAND RAPID ADHESIVE 36/CS 0.8ML: Brand: MEDLINE

## (undated) DEVICE — ADAPTER,CATHETER/SYRINGE/LUER,STERILE: Brand: MEDLINE